# Patient Record
Sex: MALE | Race: WHITE | NOT HISPANIC OR LATINO | Employment: OTHER | URBAN - METROPOLITAN AREA
[De-identification: names, ages, dates, MRNs, and addresses within clinical notes are randomized per-mention and may not be internally consistent; named-entity substitution may affect disease eponyms.]

---

## 2017-03-07 ENCOUNTER — OFFICE VISIT - RIVER FALLS (OUTPATIENT)
Dept: FAMILY MEDICINE | Facility: CLINIC | Age: 62
End: 2017-03-07

## 2017-03-15 ENCOUNTER — OFFICE VISIT - RIVER FALLS (OUTPATIENT)
Dept: FAMILY MEDICINE | Facility: CLINIC | Age: 62
End: 2017-03-15

## 2017-03-15 ASSESSMENT — MIFFLIN-ST. JEOR: SCORE: 1990.55

## 2017-06-30 ENCOUNTER — OFFICE VISIT - RIVER FALLS (OUTPATIENT)
Dept: FAMILY MEDICINE | Facility: CLINIC | Age: 62
End: 2017-06-30

## 2017-06-30 ASSESSMENT — MIFFLIN-ST. JEOR: SCORE: 1961.52

## 2017-09-06 ENCOUNTER — OFFICE VISIT - RIVER FALLS (OUTPATIENT)
Dept: FAMILY MEDICINE | Facility: CLINIC | Age: 62
End: 2017-09-06

## 2017-09-06 ASSESSMENT — MIFFLIN-ST. JEOR: SCORE: 1991.46

## 2018-04-09 ENCOUNTER — AMBULATORY - RIVER FALLS (OUTPATIENT)
Dept: FAMILY MEDICINE | Facility: CLINIC | Age: 63
End: 2018-04-09

## 2018-04-10 LAB
CHOLEST SERPL-MCNC: 138 MG/DL
CHOLEST/HDLC SERPL: 2.3 {RATIO}
CREAT SERPL-MCNC: 0.99 MG/DL (ref 0.7–1.25)
GLUCOSE BLD-MCNC: 125 MG/DL (ref 65–99)
HBA1C MFR BLD: 5.4 %
HDLC SERPL-MCNC: 59 MG/DL
LDLC SERPL CALC-MCNC: 58 MG/DL
NONHDLC SERPL-MCNC: 79 MG/DL
TRIGL SERPL-MCNC: 129 MG/DL

## 2018-04-17 ENCOUNTER — OFFICE VISIT - RIVER FALLS (OUTPATIENT)
Dept: FAMILY MEDICINE | Facility: CLINIC | Age: 63
End: 2018-04-17

## 2018-04-17 ASSESSMENT — MIFFLIN-ST. JEOR: SCORE: 1964.24

## 2018-10-31 ENCOUNTER — OFFICE VISIT - RIVER FALLS (OUTPATIENT)
Dept: FAMILY MEDICINE | Facility: CLINIC | Age: 63
End: 2018-10-31

## 2019-02-06 ENCOUNTER — OFFICE VISIT - RIVER FALLS (OUTPATIENT)
Dept: FAMILY MEDICINE | Facility: CLINIC | Age: 64
End: 2019-02-06

## 2019-02-06 ENCOUNTER — COMMUNICATION - RIVER FALLS (OUTPATIENT)
Dept: FAMILY MEDICINE | Facility: CLINIC | Age: 64
End: 2019-02-06

## 2019-02-06 ASSESSMENT — MIFFLIN-ST. JEOR: SCORE: 1963.34

## 2019-02-07 LAB
BUN SERPL-MCNC: 16 MG/DL (ref 7–25)
BUN/CREAT RATIO - HISTORICAL: ABNORMAL (ref 6–22)
CALCIUM SERPL-MCNC: 9.7 MG/DL (ref 8.6–10.3)
CHLORIDE BLD-SCNC: 102 MMOL/L (ref 98–110)
CO2 SERPL-SCNC: 29 MMOL/L (ref 20–32)
CREAT SERPL-MCNC: 0.99 MG/DL (ref 0.7–1.25)
EGFRCR SERPLBLD CKD-EPI 2021: 81 ML/MIN/1.73M2
GLUCOSE BLD-MCNC: 124 MG/DL (ref 65–99)
HGB BLD-MCNC: 15.8 GM/DL (ref 13.2–17.1)
POTASSIUM BLD-SCNC: 4.4 MMOL/L (ref 3.5–5.3)
SODIUM SERPL-SCNC: 137 MMOL/L (ref 135–146)

## 2019-06-05 ENCOUNTER — OFFICE VISIT - RIVER FALLS (OUTPATIENT)
Dept: FAMILY MEDICINE | Facility: CLINIC | Age: 64
End: 2019-06-05

## 2019-06-05 ASSESSMENT — MIFFLIN-ST. JEOR: SCORE: 1984.2

## 2019-06-06 ENCOUNTER — COMMUNICATION - RIVER FALLS (OUTPATIENT)
Dept: FAMILY MEDICINE | Facility: CLINIC | Age: 64
End: 2019-06-06

## 2019-06-06 LAB
CHOLEST SERPL-MCNC: 133 MG/DL
CHOLEST/HDLC SERPL: 2.1 {RATIO}
HBA1C MFR BLD: 5.6 %
HDLC SERPL-MCNC: 62 MG/DL
LDLC SERPL CALC-MCNC: 53 MG/DL
NONHDLC SERPL-MCNC: 71 MG/DL
TRIGL SERPL-MCNC: 99 MG/DL

## 2019-12-18 ENCOUNTER — OFFICE VISIT - RIVER FALLS (OUTPATIENT)
Dept: FAMILY MEDICINE | Facility: CLINIC | Age: 64
End: 2019-12-18

## 2019-12-18 ASSESSMENT — MIFFLIN-ST. JEOR: SCORE: 1982.39

## 2020-01-20 LAB — COLOGUARD-ABSTRACT: NEGATIVE

## 2020-03-20 ENCOUNTER — OFFICE VISIT - RIVER FALLS (OUTPATIENT)
Dept: FAMILY MEDICINE | Facility: CLINIC | Age: 65
End: 2020-03-20

## 2020-03-31 ENCOUNTER — COMMUNICATION - RIVER FALLS (OUTPATIENT)
Dept: FAMILY MEDICINE | Facility: CLINIC | Age: 65
End: 2020-03-31

## 2020-07-07 ENCOUNTER — AMBULATORY - RIVER FALLS (OUTPATIENT)
Dept: FAMILY MEDICINE | Facility: CLINIC | Age: 65
End: 2020-07-07

## 2020-07-08 ENCOUNTER — COMMUNICATION - RIVER FALLS (OUTPATIENT)
Dept: FAMILY MEDICINE | Facility: CLINIC | Age: 65
End: 2020-07-08

## 2020-07-08 LAB
BUN SERPL-MCNC: 18 MG/DL (ref 7–25)
BUN/CREAT RATIO - HISTORICAL: ABNORMAL (ref 6–22)
CALCIUM SERPL-MCNC: 9.1 MG/DL (ref 8.6–10.3)
CHLORIDE BLD-SCNC: 105 MMOL/L (ref 98–110)
CHOLEST SERPL-MCNC: 131 MG/DL
CHOLEST/HDLC SERPL: 2.1 {RATIO}
CO2 SERPL-SCNC: 28 MMOL/L (ref 20–32)
CREAT SERPL-MCNC: 1.01 MG/DL (ref 0.7–1.25)
EGFRCR SERPLBLD CKD-EPI 2021: 78 ML/MIN/1.73M2
GLUCOSE BLD-MCNC: 112 MG/DL (ref 65–99)
HBA1C MFR BLD: 5.5 %
HDLC SERPL-MCNC: 62 MG/DL
LDLC SERPL CALC-MCNC: 49 MG/DL
NONHDLC SERPL-MCNC: 69 MG/DL
POTASSIUM BLD-SCNC: 3.8 MMOL/L (ref 3.5–5.3)
SODIUM SERPL-SCNC: 140 MMOL/L (ref 135–146)
TRIGL SERPL-MCNC: 110 MG/DL

## 2020-07-21 ENCOUNTER — OFFICE VISIT - RIVER FALLS (OUTPATIENT)
Dept: FAMILY MEDICINE | Facility: CLINIC | Age: 65
End: 2020-07-21

## 2020-07-21 ASSESSMENT — MIFFLIN-ST. JEOR: SCORE: 2012.33

## 2021-02-18 ENCOUNTER — OFFICE VISIT - RIVER FALLS (OUTPATIENT)
Dept: FAMILY MEDICINE | Facility: CLINIC | Age: 66
End: 2021-02-18

## 2021-02-18 ASSESSMENT — MIFFLIN-ST. JEOR: SCORE: 2018.22

## 2021-03-10 ENCOUNTER — AMBULATORY - RIVER FALLS (OUTPATIENT)
Dept: FAMILY MEDICINE | Facility: CLINIC | Age: 66
End: 2021-03-10

## 2021-04-07 ENCOUNTER — AMBULATORY - RIVER FALLS (OUTPATIENT)
Dept: FAMILY MEDICINE | Facility: CLINIC | Age: 66
End: 2021-04-07

## 2021-06-06 ENCOUNTER — OFFICE VISIT - RIVER FALLS (OUTPATIENT)
Dept: FAMILY MEDICINE | Facility: CLINIC | Age: 66
End: 2021-06-06

## 2021-06-06 ASSESSMENT — MIFFLIN-ST. JEOR: SCORE: 2024.12

## 2021-06-09 ENCOUNTER — OFFICE VISIT - RIVER FALLS (OUTPATIENT)
Dept: FAMILY MEDICINE | Facility: CLINIC | Age: 66
End: 2021-06-09

## 2021-06-09 ASSESSMENT — MIFFLIN-ST. JEOR: SCORE: 2032.74

## 2021-08-20 ENCOUNTER — OFFICE VISIT - RIVER FALLS (OUTPATIENT)
Dept: FAMILY MEDICINE | Facility: CLINIC | Age: 66
End: 2021-08-20

## 2021-08-20 ASSESSMENT — MIFFLIN-ST. JEOR: SCORE: 2033.64

## 2021-08-21 LAB
BUN SERPL-MCNC: 14 MG/DL (ref 7–25)
BUN/CREAT RATIO - HISTORICAL: ABNORMAL (ref 6–22)
CALCIUM SERPL-MCNC: 9.4 MG/DL (ref 8.6–10.3)
CHLORIDE BLD-SCNC: 103 MMOL/L (ref 98–110)
CHOLEST SERPL-MCNC: 119 MG/DL
CHOLEST/HDLC SERPL: 2.4 {RATIO}
CO2 SERPL-SCNC: 28 MMOL/L (ref 20–32)
CREAT SERPL-MCNC: 0.95 MG/DL (ref 0.7–1.25)
EGFRCR SERPLBLD CKD-EPI 2021: 83 ML/MIN/1.73M2
GLUCOSE BLD-MCNC: 115 MG/DL (ref 65–99)
HDLC SERPL-MCNC: 50 MG/DL
LDLC SERPL CALC-MCNC: 51 MG/DL
NONHDLC SERPL-MCNC: 69 MG/DL
POTASSIUM BLD-SCNC: 3.9 MMOL/L (ref 3.5–5.3)
SODIUM SERPL-SCNC: 138 MMOL/L (ref 135–146)
TRIGL SERPL-MCNC: 94 MG/DL

## 2021-08-24 ENCOUNTER — COMMUNICATION - RIVER FALLS (OUTPATIENT)
Dept: FAMILY MEDICINE | Facility: CLINIC | Age: 66
End: 2021-08-24

## 2022-02-02 ENCOUNTER — AMBULATORY - RIVER FALLS (OUTPATIENT)
Dept: FAMILY MEDICINE | Facility: CLINIC | Age: 67
End: 2022-02-02

## 2022-02-12 VITALS
DIASTOLIC BLOOD PRESSURE: 79 MMHG | BODY MASS INDEX: 33.82 KG/M2 | WEIGHT: 255.2 LBS | SYSTOLIC BLOOD PRESSURE: 113 MMHG | HEART RATE: 76 BPM | HEIGHT: 73 IN | TEMPERATURE: 98.3 F

## 2022-02-12 VITALS
DIASTOLIC BLOOD PRESSURE: 70 MMHG | SYSTOLIC BLOOD PRESSURE: 124 MMHG | HEIGHT: 73 IN | BODY MASS INDEX: 33.03 KG/M2 | WEIGHT: 249.2 LBS | TEMPERATURE: 98.3 F | HEART RATE: 74 BPM | OXYGEN SATURATION: 94 %

## 2022-02-12 VITALS
HEART RATE: 67 BPM | DIASTOLIC BLOOD PRESSURE: 79 MMHG | WEIGHT: 248.6 LBS | HEIGHT: 73 IN | SYSTOLIC BLOOD PRESSURE: 132 MMHG | TEMPERATURE: 98 F | BODY MASS INDEX: 32.95 KG/M2

## 2022-02-12 VITALS
WEIGHT: 259.8 LBS | BODY MASS INDEX: 34.43 KG/M2 | HEIGHT: 73 IN | DIASTOLIC BLOOD PRESSURE: 78 MMHG | SYSTOLIC BLOOD PRESSURE: 135 MMHG | TEMPERATURE: 98.1 F | HEART RATE: 76 BPM

## 2022-02-12 VITALS
HEART RATE: 71 BPM | TEMPERATURE: 97.7 F | BODY MASS INDEX: 33.41 KG/M2 | SYSTOLIC BLOOD PRESSURE: 138 MMHG | WEIGHT: 253.2 LBS | DIASTOLIC BLOOD PRESSURE: 78 MMHG

## 2022-02-12 VITALS
BODY MASS INDEX: 33.75 KG/M2 | OXYGEN SATURATION: 94 % | DIASTOLIC BLOOD PRESSURE: 78 MMHG | BODY MASS INDEX: 33.8 KG/M2 | WEIGHT: 255.8 LBS | HEIGHT: 73 IN | HEART RATE: 76 BPM | WEIGHT: 255 LBS | SYSTOLIC BLOOD PRESSURE: 128 MMHG | TEMPERATURE: 98.6 F | HEART RATE: 76 BPM | TEMPERATURE: 99 F | SYSTOLIC BLOOD PRESSURE: 132 MMHG | DIASTOLIC BLOOD PRESSURE: 78 MMHG

## 2022-02-12 VITALS
HEIGHT: 73 IN | BODY MASS INDEX: 33 KG/M2 | HEART RATE: 66 BPM | SYSTOLIC BLOOD PRESSURE: 127 MMHG | DIASTOLIC BLOOD PRESSURE: 77 MMHG | WEIGHT: 249 LBS | TEMPERATURE: 97.2 F

## 2022-02-12 VITALS
SYSTOLIC BLOOD PRESSURE: 152 MMHG | BODY MASS INDEX: 33.56 KG/M2 | TEMPERATURE: 97.2 F | HEART RATE: 70 BPM | HEIGHT: 73 IN | WEIGHT: 253.2 LBS | DIASTOLIC BLOOD PRESSURE: 84 MMHG

## 2022-02-12 VITALS
HEART RATE: 67 BPM | DIASTOLIC BLOOD PRESSURE: 75 MMHG | WEIGHT: 264.3 LBS | SYSTOLIC BLOOD PRESSURE: 134 MMHG | TEMPERATURE: 98.2 F | DIASTOLIC BLOOD PRESSURE: 77 MMHG | HEART RATE: 64 BPM | TEMPERATURE: 97 F | BODY MASS INDEX: 34.78 KG/M2 | BODY MASS INDEX: 35.03 KG/M2 | HEIGHT: 73 IN | HEIGHT: 73 IN | OXYGEN SATURATION: 95 % | SYSTOLIC BLOOD PRESSURE: 154 MMHG | WEIGHT: 262.4 LBS

## 2022-02-12 VITALS
HEART RATE: 75 BPM | BODY MASS INDEX: 33.61 KG/M2 | HEIGHT: 73 IN | WEIGHT: 253.6 LBS | SYSTOLIC BLOOD PRESSURE: 132 MMHG | TEMPERATURE: 98.3 F | DIASTOLIC BLOOD PRESSURE: 72 MMHG

## 2022-02-12 VITALS
BODY MASS INDEX: 34.6 KG/M2 | HEIGHT: 73 IN | SYSTOLIC BLOOD PRESSURE: 128 MMHG | WEIGHT: 261.1 LBS | DIASTOLIC BLOOD PRESSURE: 76 MMHG | TEMPERATURE: 97.3 F | HEART RATE: 62 BPM

## 2022-02-12 VITALS
BODY MASS INDEX: 35.06 KG/M2 | TEMPERATURE: 98.7 F | SYSTOLIC BLOOD PRESSURE: 138 MMHG | DIASTOLIC BLOOD PRESSURE: 80 MMHG | HEIGHT: 73 IN | HEART RATE: 61 BPM | WEIGHT: 264.5 LBS

## 2022-02-15 NOTE — LETTER
(Inserted Image. Unable to display)   January 17, 2020      ALEC VEGA   [place], [place] [id number]        Dear ALEC,      Thank you for selecting [facility] for your healthcare needs.     The results of the cologuard stool test was received and your result was negative.      A negative result indicates a lower likelihood that colorectal cancer or advanced adenoma (pre-cancer) is present.  Periodic Colorectal Cancer Screenings are recommended in the future approximately every 3 years.            Please contact me or my assistant at [phone] if you have any questions or concerns.     Sincerely,        Samuel Zacarias MD

## 2022-02-15 NOTE — NURSING NOTE
Comprehensive Intake Entered On:  6/9/2021 8:47 AM CDT    Performed On:  6/9/2021 8:43 AM CDT by Jessi King CMA               Summary   Chief Complaint :   f/u right elbow olecranon busitis - seen by RONEN Schwartz am, wrapping up until yest - Lana noticed bruising down arm, denies much pain, swelling has decreased    Weight Measured :   264.3 lb(Converted to: 264 lb 5 oz, 119.884 kg)    Height Measured :   73 in(Converted to: 6 ft 1 in, 185.42 cm)    Body Mass Index :   34.87 kg/m2 (HI)    Body Surface Area :   2.48 m2   Systolic Blood Pressure :   146 mmHg (HI)    Diastolic Blood Pressure :   75 mmHg   Mean Arterial Pressure :   99 mmHg   Peripheral Pulse Rate :   67 bpm   Temperature Tympanic :   98.2 DegF(Converted to: 36.8 DegC)    Oxygen Saturation :   95 %   Jessi King CMA - 6/9/2021 8:43 AM CDT   Health Status   Allergies Verified? :   Yes   Medication History Verified? :   Yes   Medical History Verified? :   Yes   Pre-Visit Planning Status :   Completed   Tobacco Use? :   Former smoker   Jessi King CMA - 6/9/2021 8:43 AM CDT   ID Risk Screen   Recent Travel History :   No recent travel   Family Member Travel History :   No recent travel   Other Exposure to Infectious Disease :   Unknown   COVID-19 Testing Status :   No positive COVID-19 test   Jessi King CMA - 6/9/2021 8:43 AM CDT   Social History   Social History   (As Of: 6/9/2021 8:47:39 AM CDT)   Alcohol:        Current, socially   (Last Updated: 4/20/2018 9:05:15 AM CDT by Mackenzie Lomax)          Tobacco:        Former smoker, quit more than 30 days ago   (Last Updated: 2/18/2021 9:58:55 AM CST by Julia Palencia)          Electronic Cigarette/Vaping:        Electronic Cigarette Use: Never.   (Last Updated: 2/18/2021 9:58:59 AM CST by Julia Palencia)          Substance Abuse:        Never   (Last Updated: 4/20/2018 9:05:30 AM CDT by Mackenzie Lomax)          Employment/School:        Retired   (Last Updated: 4/20/2018 9:05:39 AM CDT by Dami  Mackenzie)          Home/Environment:        Marital status: .  Spouse/Partner name: Mary.  Risks in environment: Does not wear helmet.   (Last Updated: 4/20/2018 9:05:51 AM CDT by Mackenzie Lomax)          Nutrition/Health:        Type of diet: Regular.   (Last Updated: 4/20/2018 9:06:01 AM CDT by Mackenzie Lomax)          Exercise:        Exercise type: Walking.   (Last Updated: 4/20/2018 9:06:05 AM CDT by Mackenzie Lomax)          Sexual:        Sexually active: Yes.  Sexual orientation: Straight or heterosexual.  Contraceptive Use Details: None.   (Last Updated: 4/20/2018 9:06:15 AM CDT by Mackenzie Lomax)

## 2022-02-15 NOTE — PROGRESS NOTES
Patient:   ALEC VEGA            MRN: [id number]            FIN: [id number]               Age:   63 years     Sex:  Male     :  1955   Associated Diagnoses:   None   Author:   Rell KHAN, Samuel      Procedure   EKG procedure   Indication: preop.     Position: supine.     EKG findings   Interpretation: by primary care provider.     Rhythm: sinus bradycardia.     Axis: normal axis, normal configuration.     Within normal limits.     Intervals: NE normal, QRS normal, QT normal.     Normal EKG.     P waves: normal.     QRS complex: normal, no Q waves present.     ST-T-U complex: normal.     Interpretation: normal EKG.     Discussed: with patient.        Impression and Plan   Orders

## 2022-02-15 NOTE — NURSING NOTE
Vital Signs Entered On:  6/9/2021 8:48 AM CDT    Performed On:  6/9/2021 8:47 AM CDT by Jessi King CMA               Vital Signs   Systolic Blood Pressure :   134 mmHg (HI)    Diastolic Blood Pressure :   75 mmHg   Mean Arterial Pressure :   95 mmHg   Jessi King CMA - 6/9/2021 8:47 AM CDT

## 2022-02-15 NOTE — LETTER
(Inserted Image. Unable to display)   May 01, 2019        ALEC VEGA   [place], [place] [id number]        Dear ALEC,      Thank you for selecting [facility] (previously [facility] for your healthcare needs.    Our records indicate you are due for the following services:     Hypertension check ~ please remember to bring your at-home blood pressure readings with you to your appointment.     To schedule an appointment or if you have further questions, please contact your primary clinic:   [facility]       [phone]   Replaced by [facility]       [phone]              [facility]     [phone]      Powered by [employer]    Sincerely,    Samuel Zacarias M.D.

## 2022-02-15 NOTE — TELEPHONE ENCOUNTER
Entered by Mya Núñez CMA on November 30, 2019 1:14:49 PM CST  ---------------------  From: Mya Nñúez CMA   To: [facility]    Sent: 11/30/2019 1:14:49 PM CST  Subject: Medication Management     ** Submitted: **  Order:losartan (losartan 100 mg oral tablet)  1 tab(s)  Oral  daily  Qty:  30 tab(s)        Refills:  0          Substitutions Allowed     Route To [facility]    Signed by Mya Núñez CMA  11/30/2019 1:14:00 PM    ** Submitted: **  Complete:losartan (losartan 100 mg oral tablet)   Signed by Mya Núñez CMA  11/30/2019 1:14:00 PM    ** Not Approved:  **  losartan (LOSARTAN POTASSIUM 100MG TABS)  TAKE ONE TABLET BY MOUTH EVERY DAY  Qty:  30 unknown unit        Days Supply:  30        Refills:  5          Substitutions Allowed     Route To [facility]   Signed by Mya Núñez CMA            ------------------------------------------  From: [facility]  To: Samuel Zacarias MD  Sent: November 29, 2019 8:27:43 AM CST  Subject: Medication Management  Due: November 30, 2019 8:27:43 AM CST    ** On Hold Pending Signature **  Drug: losartan (losartan 100 mg oral tablet)  TAKE ONE TABLET BY MOUTH EVERY DAY  Quantity: 30 unknown unit  Days Supply: 30  Refills: 5  Substitutions Allowed  Notes from Pharmacy:     Dispensed Drug: losartan (losartan 100 mg oral tablet)  TAKE ONE TABLET BY MOUTH EVERY DAY  Quantity: 30 unknown unit  Days Supply: 30  Refills: 5  Substitutions Allowed  Notes from Pharmacy:   ------------------------------------------Med Refill      Date of last office visit and reason:  6/5/19; HTN      Date of last Med Check / Px:   6/5/19  Date of last labs pertaining to med:  2/6/19    RTC order in chart:  yes; December    For Protocol refill, has patient been contacted:  msg sent to pharmacy

## 2022-02-15 NOTE — NURSING NOTE
Hearing and Vision Screening Entered On:  6/5/2019 1:02 PM CDT    Performed On:  6/5/2019 1:01 PM CDT by Julia Palencia               Hearing and Vision Screening   Audiogram Result Right Ear :   Pass   Audiogram Result Left Ear :   Pass   Hearing Screen Comments :   no concerns with hearing.    Julia Palencia - 6/5/2019 1:01 PM CDT

## 2022-02-15 NOTE — NURSING NOTE
CAGE Assessment Entered On:  7/21/2020 1:02 PM CDT    Performed On:  7/21/2020 1:02 PM CDT by Julia Palencia               Assessment   Have you ever felt you should cut down on your drinking :   No   Have people annoyed you by criticizing your drinking :   No   Have you ever felt bad or guilty about your drinking :   No   Have you ever taken a drink first thing in the morning to steady your nerves or get rid of a hangover (Eye-opener) :   No   CAGE Score :   0    Julia Palencia - 7/21/2020 1:02 PM CDT

## 2022-02-15 NOTE — NURSING NOTE
Comprehensive Intake Entered On:  6/6/2021 9:37 AM CDT    Performed On:  6/6/2021 9:35 AM CDT by Julia Palencia               Summary   Chief Complaint :   Right elbow swelling, minimal pain.  Did have insect bite 3 weeks ago and again last week.    Weight Measured :   262.4 lb(Converted to: 262 lb 6 oz, 119.023 kg)    Height Measured :   73 in(Converted to: 6 ft 1 in, 185.42 cm)    Body Mass Index :   34.62 kg/m2 (HI)    Body Surface Area :   2.47 m2   Systolic Blood Pressure :   154 mmHg (HI)    Diastolic Blood Pressure :   77 mmHg   Mean Arterial Pressure :   103 mmHg   Peripheral Pulse Rate :   64 bpm   BP Method :   Electronic   HR Method :   Electronic   Temperature Tympanic :   97.0 DegF(Converted to: 36.1 DegC)  (LOW)    Julia Palencia - 6/6/2021 9:35 AM CDT   Health Status   Allergies Verified? :   Yes   Medication History Verified? :   Yes   Julia Palencia - 6/6/2021 9:35 AM CDT   ID Risk Screen   Recent Travel History :   No recent travel   Family Member Travel History :   No recent travel   Other Exposure to Infectious Disease :   Unknown   COVID-19 Testing Status :   No positive COVID-19 test   Julia Palencia - 6/6/2021 9:35 AM CDT   Social History   Social History   (As Of: 6/6/2021 9:37:33 AM CDT)   Alcohol:        Current, socially   (Last Updated: 4/20/2018 9:05:15 AM CDT by Mackenzie Lomax)          Tobacco:        Former smoker, quit more than 30 days ago   (Last Updated: 2/18/2021 9:58:55 AM CST by Julia Palencia)          Electronic Cigarette/Vaping:        Electronic Cigarette Use: Never.   (Last Updated: 2/18/2021 9:58:59 AM CST by Julia Palencia)          Substance Abuse:        Never   (Last Updated: 4/20/2018 9:05:30 AM CDT by Mackenzie Lomax)          Employment/School:        Retired   (Last Updated: 4/20/2018 9:05:39 AM CDT by Mackenzie Lomax)          Home/Environment:        Marital status: .  Spouse/Partner name: Mary.  Risks in environment: Does not wear helmet.    (Last Updated: 4/20/2018 9:05:51 AM CDT by Mackenzie Lomax)          Nutrition/Health:        Type of diet: Regular.   (Last Updated: 4/20/2018 9:06:01 AM CDT by Mackenzie Lomax)          Exercise:        Exercise type: Walking.   (Last Updated: 4/20/2018 9:06:05 AM CDT by Mackenzie Lomax)          Sexual:        Sexually active: Yes.  Sexual orientation: Straight or heterosexual.  Contraceptive Use Details: None.   (Last Updated: 4/20/2018 9:06:15 AM CDT by Mackenzie Lomax)

## 2022-02-15 NOTE — TELEPHONE ENCOUNTER
---------------------From: Lucy Cali CMA (Phone Messages Pool ([id number]_[facility])) To: [employer] Message Pool ([id number]_[facility]);   Sent: 12/5/2019 1:25:50 PM CSTSubject: Labs Patient will be scheduling a six month medication review visit and is wondering if any labs are needed.   His blood glucose was elevated at last visit at 126 but the A1c was 5.6.  Lipids and BMP completed at last visit.Any labs needed?[phone]  ok to leave a message---------------------From: Gala Gagnon ([employer] Message Pool ([id number])) To: Samuel Zacarias MD;   Sent: 12/6/2019 12:03:59 PM CSTSubject: FW: Labs---------------------From: Samuel Zacarias MD To: [employer] Message Pool ([id number]_[facility]);   Sent: 12/6/2019 12:25:08 PM CSTSubject: RE: Labs no labNotified pt by phone call- he will call back to schedule med check.

## 2022-02-15 NOTE — LETTER
(Inserted Image. Unable to display)   July 08, 2020      ALECTIRSO VEGA       [place], [place] [id number]        Dear ALEC,    Thank you for selecting [facility] for your healthcare needs.  Below you will find the results of the recent tests done at our clinic.     All labs acceptable.      Result Name Current Result Previous Result Reference Range   Potassium Level (mmol/L)  3.8 7/7/2020  4.4 2/6/2019 3.5 - 5.3   Glucose Level (mg/dL) ((H)) 112 7/7/2020 ((H)) 124 2/6/2019 65 - 99   Creatinine Level (mg/dL)  1.01 7/7/2020  0.99 2/6/2019 0.70 - 1.25   Hgb A1c  5.5 7/7/2020  5.6 6/5/2019  - <5.7   Cholesterol (mg/dL)  131 7/7/2020  133 6/5/2019  - <200   HDL (mg/dL)  62 7/7/2020  62 6/5/2019 > OR = 40 -    LDL  49 7/7/2020  53 6/5/2019    Triglyceride (mg/dL)  110 7/7/2020  99 6/5/2019  - <150       Please contact me or my assistant at [phone] if you have any questions.     Sincerely,        Samuel Zacarias MD        What do your labs mean?  Below is a glossary of commonly ordered labs:  LDL   Bad Cholesterol   HDL   Good Cholesterol  AST/ALT   Liver Function   Cr/Creatinine   Kidney Function  Microalbumin   Kidney Function  BUN   Kidney Function  PSA   Prostate    TSH   Thyroid Hormone  HgbA1c   Diabetes Test   Hgb (Hemoglobin)   Red Blood Cells

## 2022-02-15 NOTE — NURSING NOTE
Comprehensive Intake Entered On:  8/20/2021 2:04 PM CDT    Performed On:  8/20/2021 2:02 PM CDT by Julia Palencia               Summary   Chief Complaint :   Chronic disease visit: HTN   Weight Measured :   264.5 lb(Converted to: 264 lb 8 oz, 119.975 kg)    Height Measured :   73 in(Converted to: 6 ft 1 in, 185.42 cm)    Body Mass Index :   34.89 kg/m2 (HI)    Body Surface Area :   2.48 m2   Systolic Blood Pressure :   138 mmHg (HI)    Diastolic Blood Pressure :   80 mmHg   Mean Arterial Pressure :   99 mmHg   Peripheral Pulse Rate :   61 bpm   BP Method :   Electronic   HR Method :   Electronic   Temperature Tympanic :   98.7 DegF(Converted to: 37.1 DegC)    Julia Palencia - 8/20/2021 2:02 PM CDT   Health Status   Allergies Verified? :   Yes   Medication History Verified? :   Yes   Pre-Visit Planning Status :   Completed   Tobacco Use? :   Former smoker   Julia Palencia - 8/20/2021 2:02 PM CDT   Social History   Social History   (As Of: 8/20/2021 2:04:20 PM CDT)   Alcohol:        Current, socially   (Last Updated: 4/20/2018 9:05:15 AM CDT by Mackenzie Lomax)          Tobacco:        Former smoker, quit more than 30 days ago   (Last Updated: 2/18/2021 9:58:55 AM CST by Julia Palencia)          Electronic Cigarette/Vaping:        Electronic Cigarette Use: Never.   (Last Updated: 2/18/2021 9:58:59 AM CST by Julia Palencia)          Substance Abuse:        Never   (Last Updated: 4/20/2018 9:05:30 AM CDT by Mackenzie Lomax)          Employment/School:        Retired   (Last Updated: 4/20/2018 9:05:39 AM CDT by Mackenzie Lomax)          Home/Environment:        Marital status: .  Spouse/Partner name: Mary.  Risks in environment: Does not wear helmet.   (Last Updated: 4/20/2018 9:05:51 AM CDT by Mackenzie Lomax)          Nutrition/Health:        Type of diet: Regular.   (Last Updated: 4/20/2018 9:06:01 AM CDT by Mackenzie Lomax)          Exercise:        Exercise type: Walking.   (Last Updated: 4/20/2018 9:06:05  AM CDT by Mackenzie Lomax)          Sexual:        Sexually active: Yes.  Sexual orientation: Straight or heterosexual.  Contraceptive Use Details: None.   (Last Updated: 4/20/2018 9:06:15 AM CDT by Mackenzie Lomax)

## 2022-02-15 NOTE — PROGRESS NOTES
Patient:   ALEC VEGA            MRN: [id number]            FIN: [id number]               Age:   62 years     Sex:  Male     :  1955   Associated Diagnoses:   Hypertension; Dyslipidemia; Blood Glucose Elevated   Author:   Samuel Zacarias MD      Visit Information      Date of Service: 2017 03:45 pm  Performing Location: [facility]  Encounter#: [id number]      Primary Care Provider (PCP):  Samuel Zacarias MD    NPI# [id number]      Referring Provider:  Samuel Zacarias MD# [id number]      Chief Complaint   2017 3:49 PM CDT     HTN check up.        History of Present Illness             The patient presents for follow-up evaluation of hypertension.  The quality of hypertension symptom(s) since the patient's last visit is described as being unchanged.  Exacerbating factors consist of noncompliance with diet.  Relieving factors consist of medication.  Associated symptoms consist of none.     chief complaint and symptoms as noted above confirmed with patient   meds as directed rare use of flexeril for back      Review of Systems   Constitutional:  Negative.    Eye:  Negative.    Ear/Nose/Mouth/Throat:  Negative.    Respiratory:  Negative.    Cardiovascular:  Negative.    Gastrointestinal:  Negative.    Genitourinary:  Negative.    Hematology/Lymphatics:  Negative.    Endocrine:  Negative.    Immunologic:  Negative.    Musculoskeletal:  Negative except as documented in history of present illness.    Integumentary:  Negative.    Neurologic:  Negative.       Health Status   Allergies:    Canceled/Inactive Reactions (Selected)  No known allergies   Medications:  (Selected)   Prescriptions  Prescribed  Cozaar 25 mg oral tablet: 1 tab(s) ( 25 mg ), PO, Daily, # 90 tab(s), 1 Refill(s), Type: Maintenance, Pharmacy: [facility], 1 tab(s) po daily  Flexeril 10 mg oral tablet: 1 tab(s) ( 10 mg ), PO, TID, PRN: for spasm and pain, # 30 tab(s), 0 Refill(s),  COVID-19 Week 1 Survey      Responses   [facility] patient discharged from? [place]   Does the patient have one of the following disease processes/diagnoses(primary or secondary)? COVID-19   COVID-19 underlying condition? None   Call Number Call [phone]   Week 1 Call successful? No   Discharge diagnosis Pneumonia of right lower lobe due to methicillin resistant Staphylococcus aureus /COVID-19           Dottie Cruz LPN   Type: Maintenance, Pharmacy: [facility], 1 tab(s) po tid,PRN:for spasm and pain  Lipitor 20 mg oral tablet: 1 tab(s) ( 20 mg ), PO, Daily, # 90 tab(s), 1 Refill(s), Type: Maintenance, Pharmacy: [facility], 1 tab(s) po daily  amLODIPine 10 mg oral tablet: 1 tab(s) ( 10 mg ), po, daily, # 90 tab(s), 1 Refill(s), Type: Maintenance, Pharmacy: [facility], 1 tab(s) po daily  hydroCHLOROthiazide 12.5 mg oral capsule: 1 cap(s) ( 12.5 mg ), po, daily, # 90 cap(s), 1 Refill(s), Type: Maintenance, Pharmacy: [facility], 1 cap(s) po daily  Documented Medications  Documented  aspirin 81 mg oral tablet: 1 tab(s) ( 81 mg ), po, daily, 0 Refill(s), Type: Maintenance   Problem list:    All Problems  Biceps Tendonitis / ICD-9-.12 / Confirmed  Chronic Tension Headaches / ICD-9-.81 / Confirmed  Dyslipidemia / ICD-9-.4 / Confirmed  FH: stroke / SNOMED CT 050320675 / Confirmed  Hypertension / SNOMED CT 8329237659 / Confirmed  Hypertriglyceridemia / ICD-9-.1 / Confirmed  Obese / ICD-9-.00 / Probable  Resolved: CT Cardiac Calcium Score      Histories   Past Medical History:    Active  Biceps Tendonitis (726.12): Onset on 2/27/2010 at 54 years.  Comments:  4/20/2011 CDT 10:35 AM CDT - Geovanan Hui.  Hypertriglyceridemia (272.1): Onset on 6/4/2008 at 53 years.  FH: stroke (849017254)  Dyslipidemia (272.4)  Chronic Tension Headaches (307.81)  Resolved  CT Cardiac Calcium Score:  Resolved.  Comments:  8/20/2014 CDT 1:07 PM CDT - Julia Palencia  Date of test 8/7/14  Score: 141   Family History:    No family history items have been selected or recorded.   Procedure history:    Prosthetic hemiarthroplasty of joint (SNOMED CT 9735089383) on 4/15/2014 at 58 Years.  Comments:  10/8/2015 6:35 PM - Jayme LIMA, Chema SOTO.  left medial knee  Flexible sigmoidoscopy (SNOMED CT 09829598) on 5/20/2011 at 56  Years.  Flexible sigmoidoscopy (SNOMED CT 15283037) on 2/9/2006 at 50 Years.  Arthroscopy of shoulder (SNOMED CT 674989094) on 12/29/1998 at 43 Years.  Comments:  4/20/2011 10:52 AM - Geovanna Hui  Right.  Bunionectomy (SNOMED CT 29317659).  Breast surgery (SNOMED CT 5318774162).  Comments:  4/20/2011 10:47 AM - Geovanna Hui  As a child.   Social History:        Alcohol Assessment: Current                     Comments:                      04/20/2011 - Geovanna Hui                     Social.      Tobacco Assessment: Denies Tobacco Use      Other Assessment            Marital status.                     Comments:                      04/20/2011 - Geovanna Hui                     .        Physical Examination   Vital Signs   9/6/2017 3:49 PM CDT Temperature Tympanic 98.3 DegF    Peripheral Pulse Rate 76 bpm    HR Method Electronic    Systolic Blood Pressure 113 mmHg    Diastolic Blood Pressure 79 mmHg    Mean Arterial Pressure 90 mmHg    BP Method Electronic      Measurements from flowsheet : Measurements   9/6/2017 3:49 PM CDT Height Measured - Standard 73 in    Weight Measured - Standard 255.2 lb    BSA 2.44 m2    Body Mass Index 33.67 kg/m2      General:  Alert and oriented, No acute distress.    Eye:  Normal conjunctiva.    HENT:  Normocephalic, Tympanic membranes are clear, Oral mucosa is moist, No pharyngeal erythema.    Neck:  Supple, Non-tender, No lymphadenopathy, No thyromegaly.    Respiratory:  Breath sounds are equal, Symmetrical chest wall expansion.         Respirations: Are within normal limits.         Pattern: Regular.         Breath sounds: Bilateral, Within normal limits.    Cardiovascular:  Normal rate, Regular rhythm, No murmur, Normal peripheral perfusion, No edema.    Gastrointestinal:  Soft, Non-tender, Non-distended, Normal bowel sounds, No organomegaly.    Genitourinary:  No costovertebral angle tenderness.    Musculoskeletal:  mild tenderness anterior r ankle.     Integumentary:  Warm, Dry, No rash.       Health Maintenance      Recommendations     Pending (in the next year)        OverDue           Colorectal Cancer Screen (Occult Blood) (Male) due  05/03/13  and every 1  year(s)           Depression Screen (Male) due  06/19/14  and every 1  year(s)           Colorectal Cancer Screen (Sigmoidoscopy) (Male) due  05/20/16  and every 5  year(s)        Due            Influenza Vaccine due  09/06/17  and every 1  year(s)           Lung Cancer Screen (Male) due  09/06/17  and every 1  year(s)        Due In Future            Lipid Disorders Screen (Male) not due until  03/07/18  and every 1  year(s)           Type 2 Diabetes Mellitus Screen (Male) not due until  03/07/18  and every 1  year(s)           Body Mass Index Check (Male) not due until  06/30/18  and every 1  year(s)           High Blood Pressure Screen (Male) not due until  06/30/18  and every 1  year(s)           Tobacco Use Screen (Male) not due until  06/30/18  and every 1  year(s)     Satisfied (in the past 1 year)        Satisfied            Body Mass Index Check (Male) on  06/30/17.           Body Mass Index Check (Male) on  03/15/17.           Body Mass Index Check (Male) on  09/07/16.           High Blood Pressure Screen (Male) on  06/30/17.           High Blood Pressure Screen (Male) on  03/15/17.           High Blood Pressure Screen (Male) on  03/07/17.           High Blood Pressure Screen (Male) on  11/20/16.           High Blood Pressure Screen (Male) on  10/17/16.           High Blood Pressure Screen (Male) on  10/17/16.           High Blood Pressure Screen (Male) on  09/07/16.           Lipid Disorders Screen (Male) on  03/07/17.           Lipid Disorders Screen (Male) on  03/07/17.           Lipid Disorders Screen (Male) on  03/07/17.           Lipid Disorders Screen (Male) on  03/07/17.           Obesity Screen and Counseling (Male) on  06/30/17.           Obesity Screen and Counseling (Male) on   03/15/17.           Obesity Screen and Counseling (Male) on  03/07/17.           Obesity Screen and Counseling (Male) on  09/07/16.           Tobacco Use Screen (Male) on  06/30/17.           Tobacco Use Screen (Male) on  03/15/17.           Tobacco Use Screen (Male) on  03/07/17.           Tobacco Use Screen (Male) on  11/20/16.           Tobacco Use Screen (Male) on  09/07/16.           Type 2 Diabetes Mellitus Screen (Male) on  03/07/17.           Type 2 Diabetes Mellitus Screen (Male) on  10/17/16.        Review / Management   Results review      Impression and Plan   Diagnosis     Hypertension (ZGJ93-ZI I10).     Dyslipidemia (BWV05-JN E78.5).     Blood Glucose Elevated (ZAM14-PU R73.09).     Course:  Progressing as expected.    Plan:  Monitor blood pressure, Weight monitoring, fu 6mo, labs then BMI,Weight loss,exercise,diet discussed   .    Patient Instructions:       Counseled: Patient, Regarding diagnosis, Regarding treatment, Regarding medications, Diet.

## 2022-02-15 NOTE — PROGRESS NOTES
Patient:   ALEC VEGA            MRN: [id number]            FIN: [id number]               Age:   64 years     Sex:  Male     :  1955   Associated Diagnoses:   Hypertension; Dyslipidemia; Blood Glucose Elevated   Author:   Samuel Zacarias MD      Visit Information      Date of Service: 2019 02:55 pm  Performing Location: [facility]  Encounter#: [id number]      Primary Care Provider (PCP):  Samuel Zacarias MD    NPI# [id number]      Referring Provider:  Samuel Zacarias MD# [id number]      Chief Complaint   2019 2:56 PM CST   HTN check up        History of Present Illness             The patient presents for follow-up evaluation of hypertension.  The quality of hypertension symptom(s) since the patient's last visit is described as being unchanged.  Exacerbating factors consist of noncompliance with diet.  Relieving factors consist of medication.  Associated symptoms consist of none.     chief complaint and symptoms as noted above confirmed with patient   meds as directed rare use of flexeril for back      Review of Systems   Constitutional:  Negative.    Eye:  Negative.    Ear/Nose/Mouth/Throat:  Negative.    Respiratory:  Negative.    Cardiovascular:  Negative.    Gastrointestinal:  Negative.    Genitourinary:  Negative.    Hematology/Lymphatics:  Negative.    Endocrine:  Negative.    Immunologic:  Negative.    Musculoskeletal:  Negative except as documented in history of present illness.    Integumentary:  Negative.    Neurologic:  Negative.       Health Status   Allergies:    Canceled/Inactive Reactions (Selected)  No known allergies   Medications:  (Selected)   Prescriptions  Prescribed  amLODIPine 10 mg oral tablet: = 1 tab(s), Oral, daily, # 30 tab(s), 0 Refill(s), Type: Maintenance, Pharmacy: [facility], Pt is due for office visit., 1 tab(s) Oral daily  atorvastatin 20 mg oral tablet: = 1 tab(s) ( 20 mg ), Oral, daily, # 30 tab(s), 0  Refill(s), Type: Maintenance, Pharmacy: [facility], Needs appt for further refills, 1 tab(s) Oral daily  hydroCHLOROthiazide 12.5 mg oral capsule: = 1 cap(s) ( 12.5 mg ), Oral, daily, # 30 cap(s), 0 Refill(s), Type: Soft Stop, Pharmacy: [facility], Pt is due for office visit., 1 cap(s) Oral daily  losartan 100 mg oral tablet: = 1 tab(s), Oral, daily, # 30 tab(s), 0 Refill(s), Type: Maintenance, Pharmacy: [facility], Needs appt for further refills  Documented Medications  Documented  aspirin 81 mg oral tablet: 1 tab(s) ( 81 mg ), po, daily, 0 Refill(s), Type: Maintenance,    Medications          *denotes recorded medication          amLODIPine 10 mg oral tablet: 1 tab(s), Oral, daily, 30 tab(s), 0 Refill(s).          *aspirin 81 mg oral tablet: 81 mg, 1 tab(s), po, daily.          atorvastatin 20 mg oral tablet: 20 mg, 1 tab(s), Oral, daily, 30 tab(s), 0 Refill(s).          hydroCHLOROthiazide 12.5 mg oral capsule: 12.5 mg, 1 cap(s), Oral, daily, 30 cap(s), 0 Refill(s).          losartan 100 mg oral tablet: 1 tab(s), Oral, daily, 30 tab(s), 0 Refill(s).       Problem list:    All Problems  Biceps tendonitis / SNOMED CT 235681872 / Confirmed  Chronic tension headaches / SNOMED CT 417832439 / Confirmed  Dyslipidemia / SNOMED CT 8749113331 / Confirmed  FH: stroke / SNOMED CT 642274098 / Confirmed  Hypertension / SNOMED CT 2708085452 / Confirmed  Hypertriglyceridemia / SNOMED CT 553134319 / Confirmed  Obese / SNOMED CT 7127079749 / Probable  Resolved: CT Cardiac Calcium Score      Histories   Past Medical History:    Active  Biceps tendonitis (925413338): Onset on 2/27/2010 at 54 years.  Comments:  4/20/2011 CDT 10:35 AM CDT - Geovanna Hui.  Hypertriglyceridemia (670688851): Onset on 6/4/2008 at 53 years.  Obese (8190155846)  FH: stroke (835782437)  Dyslipidemia (5703594617)  Chronic tension headaches (333618420)  Resolved  CT Cardiac  Calcium Score:  Resolved.  Comments:  8/20/2014 CDT 1:07 PM CDT - Julia Palencia  Date of test 8/7/14  Score: 141   Family History:       Procedure history:    Prosthetic hemiarthroplasty of joint (SNOMED CT 3064356809) on 4/15/2014 at 58 Years.  Comments:  10/8/2015 6:35 PM CDT - Chema Delacruz PA-C.  left medial knee  Flexible sigmoidoscopy (SNOMED CT 30692677) on 5/20/2011 at 56 Years.  Flexible sigmoidoscopy (SNOMED CT 31535076) on 2/9/2006 at 50 Years.  Arthroscopy of shoulder (SNOMED CT 898720588) on 12/29/1998 at 43 Years.  Comments:  4/20/2011 10:52 AM REBECCAT - Geovanna Hui  Right.  Bunionectomy (SNOMED CT 87530721).  Breast surgery (SNOMED CT 6203476365).  Comments:  4/20/2011 10:47 AM REBECCAT - Geovanna Hui  As a child.   Social History:        Alcohol Assessment            Current, socially      Tobacco Assessment            Former smoker, quit more than 30 days ago      Substance Abuse Assessment            Never      Employment and Education Assessment            Retired      Home and Environment Assessment            Marital status: .  Spouse/Partner name: Mary.  Risks in environment: Does not wear helmet.      Nutrition and Health Assessment            Type of diet: Regular.      Exercise and Physical Activity Assessment            Exercise type: Walking.      Sexual Assessment            Sexually active: Yes.  Sexual orientation: Straight or heterosexual.  Contraceptive Use Details: None.        Physical Examination   Vital Signs   12/18/2019 2:56 PM CST Temperature Tympanic 97.2 DegF  LOW    Peripheral Pulse Rate 68 bpm    HR Method Electronic    Systolic Blood Pressure 156 mmHg  HI    Diastolic Blood Pressure 82 mmHg  HI    Mean Arterial Pressure 107 mmHg    BP Method Electronic      Measurements from flowsheet : Measurements   12/18/2019 2:56 PM CST Height Measured - Standard 73 in    Weight Measured - Standard 253.2 lb    BSA 2.43 m2    Body Mass Index 33.4 kg/m2  HI      General:  Alert  and oriented, No acute distress.    Eye:  Normal conjunctiva.    HENT:  Normocephalic, Tympanic membranes are clear, Oral mucosa is moist, No pharyngeal erythema.    Neck:  Supple, Non-tender, No lymphadenopathy, No thyromegaly.    Respiratory:  Breath sounds are equal, Symmetrical chest wall expansion.         Respirations: Are within normal limits.         Pattern: Regular.         Breath sounds: Bilateral, Within normal limits.    Cardiovascular:  Normal rate, Regular rhythm, No murmur, Normal peripheral perfusion, No edema.    Gastrointestinal:  Soft, Non-tender, Non-distended, Normal bowel sounds, No organomegaly.    Genitourinary:  No costovertebral angle tenderness.    Musculoskeletal:  mild tenderness anterior r ankle.    Integumentary:  Warm, Dry, No rash.       Health Maintenance      Recommendations     Pending (in the next year)        OverDue           Colorectal Cancer Screen (Occult Blood) (Male) due  05/03/13  and every 1  year(s)           Colorectal Cancer Screen (Sigmoidoscopy) (Male) due  05/20/16  and every 5  year(s)           Depression Screen (Male) due  04/17/19  and every 1  year(s)           Influenza Vaccine due  09/01/19  and every 1  year(s)        Due            Hepatitis C Screen 6880-0948 (Male) due  12/18/19  One-time only           Lung Cancer Screen (Male) due  12/18/19  and every 1  year(s)        Due In Future            Lipid Disorders Screen (Male) not due until  06/05/20  and every 1  year(s)           Type 2 Diabetes Mellitus Screen (Male) not due until  06/05/20  and every 1  year(s)     Satisfied (in the past 1 year)        Satisfied            Body Mass Index Check (Male) on  12/18/19.           Body Mass Index Check (Male) on  06/05/19.           Body Mass Index Check (Male) on  02/06/19.           High Blood Pressure Screen (Male) on  12/18/19.           High Blood Pressure Screen (Male) on  06/05/19.           High Blood Pressure Screen (Male) on  06/05/19.            High Blood Pressure Screen (Male) on  02/06/19.           Lipid Disorders Screen (Male) on  06/05/19.           Lipid Disorders Screen (Male) on  06/05/19.           Lipid Disorders Screen (Male) on  06/05/19.           Lipid Disorders Screen (Male) on  06/05/19.           Obesity Screen and Counseling (Male) on  12/18/19.           Obesity Screen and Counseling (Male) on  06/05/19.           Obesity Screen and Counseling (Male) on  02/06/19.           Tobacco Use Screen (Male) on  12/18/19.           Tobacco Use Screen (Male) on  06/05/19.           Tobacco Use Screen (Male) on  02/06/19.           Type 2 Diabetes Mellitus Screen (Male) on  06/05/19.          Review / Management   Results review      Impression and Plan   Diagnosis     Hypertension (ZPY38-OX I10).     Dyslipidemia (ERZ81-ZU E78.5).     Blood Glucose Elevated (DES75-NH R73.09).     Course:  Not progressing as expected.    Plan:  Monitor blood pressure, Weight monitoring, fu 6mo,  BMI,Weight loss,exercise,diet discussed  BP up very stressfull day had been nl  will get bp checked regularly and fu 6 mo.    Patient Instructions:       Counseled: Patient, Regarding diagnosis, Regarding treatment, Regarding medications, Diet.

## 2022-02-15 NOTE — PROGRESS NOTES
Patient:   ALEC VEGA            MRN: [id number]            FIN: [id number]               Age:   63 years     Sex:  Male     :  1955   Associated Diagnoses:   Preoperative exam; Knee osteoarthritis   Author:   Samuel Zacarias MD      Preoperative Information   Indication for surgery:  djd r knee.    Accompanied by:  No one.    Source of history:  Self.    History limitation:  None.       Chief Complaint   2019 9:55 AM CST     Pre-op for Right knee replacement with Dr. Rees  at [facility] on .      Review of Systems   Constitutional:  Negative.    Eye:  Negative.    Ear/Nose/Mouth/Throat:  Negative.    Respiratory:  Negative.    Cardiovascular:  Negative.    Gastrointestinal:  Negative.    Genitourinary:  Negative.    Hematology/Lymphatics:  Negative.    Endocrine:  Negative.    Immunologic:  Negative.    Musculoskeletal:  Joint pain.    Integumentary:  Negative.    Neurologic:  Negative.    Psychiatric:  Negative.    All other systems reviewed and negative      Health Status   Allergies:    Allergic Reactions (Selected)  Severity Not Documented  Latex (Rash)   Problem list:    All Problems  Biceps tendonitis / SNOMED CT 551867034 / Confirmed  Chronic tension headaches / SNOMED CT 963401277 / Confirmed  Dyslipidemia / SNOMED CT 0430257972 / Confirmed  FH: stroke / SNOMED CT 680735862 / Confirmed  Hypertension / SNOMED CT 6571841822 / Confirmed  Hypertriglyceridemia / SNOMED CT 310767582 / Confirmed  Obese / SNOMED CT 2129519473 / Probable  Resolved: CT Cardiac Calcium Score   Medications:  (Selected)   Prescriptions  Prescribed  Lipitor 20 mg oral tablet: = 1 tab(s) ( 20 mg ), PO, Daily, # 90 tab(s), 1 Refill(s), Type: Maintenance, Pharmacy: [facility], 1 tab(s) Oral daily  amLODIPine 10 mg oral tablet: = 1 tab(s) ( 10 mg ), po, daily, # 90 tab(s), 1 Refill(s), Type: Soft Stop, Pharmacy: [facility], 1 tab(s) Oral daily  cyclobenzaprine 10 mg  oral tablet: See Instructions, Instructions: TAKE ONE TABLET BY MOUTH THREE TIMES A DAY, # 30 unknown unit, Type: Soft Stop, Pharmacy: [facility]  hydroCHLOROthiazide 12.5 mg oral capsule: = 1 cap(s) ( 12.5 mg ), po, daily, # 90 cap(s), 1 Refill(s), Type: Maintenance, Pharmacy: [facility], 1 cap(s) Oral daily  losartan 100 mg oral tablet: = 1 tab(s) ( 100 mg ), PO, Daily, # 90 tab(s), 1 Refill(s), Type: Maintenance, Pharmacy: [facility], 1 tab(s) Oral daily  Documented Medications  Documented  aspirin 81 mg oral tablet: 1 tab(s) ( 81 mg ), po, daily, 0 Refill(s), Type: Maintenance,    Medications          *denotes recorded medication          amLODIPine 10 mg oral tablet: 10 mg, 1 tab(s), po, daily, 90 tab(s), 1 Refill(s).          *aspirin 81 mg oral tablet: 81 mg, 1 tab(s), po, daily.          Lipitor 20 mg oral tablet: 20 mg, 1 tab(s), PO, Daily, 90 tab(s), 1 Refill(s).          cyclobenzaprine 10 mg oral tablet: See Instructions, TAKE ONE TABLET BY MOUTH THREE TIMES A DAY, 30 unknown unit.          hydroCHLOROthiazide 12.5 mg oral capsule: 12.5 mg, 1 cap(s), po, daily, 90 cap(s), 1 Refill(s).          losartan 100 mg oral tablet: 100 mg, 1 tab(s), PO, Daily, 90 tab(s), 1 Refill(s).        Histories   Past Medical History:    Active  Biceps tendonitis (853293781): Onset on 2/27/2010 at 54 years.  Comments:  4/20/2011 CDT 10:35 AM CDT - Geovanna Hui.  Hypertriglyceridemia (445753709): Onset on 6/4/2008 at 53 years.  Obese (5088224873)  FH: stroke (445743759)  Dyslipidemia (3490090375)  Chronic tension headaches (074119388)  Resolved  CT Cardiac Calcium Score:  Resolved.  Comments:  8/20/2014 CDT 1:07 PM CDT - Julia Palencia  Date of test 8/7/14  Score: 141   Family History:       Procedure history:    Prosthetic hemiarthroplasty of joint (SNOMED CT 0176274730) on 4/15/2014 at 58 Years.  Comments:  10/8/2015 6:35 PM SAUNDRA Delacruz  Chema LIMA.  left medial knee  Flexible sigmoidoscopy (SNOMED CT 91140036) on 5/20/2011 at 56 Years.  Flexible sigmoidoscopy (SNOMED CT 76184751) on 2/9/2006 at 50 Years.  Arthroscopy of shoulder (SNOMED CT 997171946) on 12/29/1998 at 43 Years.  Comments:  4/20/2011 10:52 AM REBECCARAEGAN Miguel Ez Krystynaan  Right.  Bunionectomy (SNOMED CT 59428903).  Breast surgery (SNOMED CT 1956660664).  Comments:  4/20/2011 10:47 AM Geovanna Esquivel  As a child.   Social History:        Alcohol Assessment            Current, socially      Tobacco Assessment            Former smoker, quit more than 30 days ago      Substance Abuse Assessment            Never      Employment and Education Assessment            Retired      Home and Environment Assessment            Marital status: .  Spouse/Partner name: Mary.  Risks in environment: Does not wear helmet.      Nutrition and Health Assessment            Type of diet: Regular.      Exercise and Physical Activity Assessment            Exercise type: Walking.      Sexual Assessment            Sexually active: Yes.  Sexual orientation: Straight or heterosexual.  Contraceptive Use Details: None.,        Alcohol Assessment            Current, socially      Tobacco Assessment            Former smoker, quit more than 30 days ago      Substance Abuse Assessment            Never      Employment and Education Assessment            Retired      Home and Environment Assessment            Marital status: .  Spouse/Partner name: Mary.  Risks in environment: Does not wear helmet.      Nutrition and Health Assessment            Type of diet: Regular.      Exercise and Physical Activity Assessment            Exercise type: Walking.      Sexual Assessment            Sexually active: Yes.  Sexual orientation: Straight or heterosexual.  Contraceptive Use Details: None.      Physical Examination   Vital Signs   2/6/2019 9:55 AM CST Temperature Tympanic 97.2 DegF  LOW    Peripheral Pulse Rate  66 bpm    Systolic Blood Pressure 127 mmHg    Diastolic Blood Pressure 77 mmHg    Mean Arterial Pressure 94 mmHg      Measurements from flowsheet : Measurements   2/6/2019 9:55 AM CST Height Measured - Standard 73 in    Weight Measured - Standard 249 lb    BSA 2.41 m2    Body Mass Index 32.85 kg/m2  HI      General:  Alert and oriented, No acute distress.    Eye:  Normal conjunctiva.    HENT:  Normocephalic, Tympanic membranes are clear, Oral mucosa is moist, No pharyngeal erythema.    Neck:  Supple, Non-tender, No carotid bruit, No jugular venous distention, No lymphadenopathy, No thyromegaly.    Respiratory:  Breath sounds are equal, Symmetrical chest wall expansion.         Respirations: Are within normal limits.         Pattern: Regular.         Breath sounds: Bilateral, Within normal limits.    Cardiovascular:  Normal rate, Regular rhythm, No murmur, Good pulses equal in all extremities, Normal peripheral perfusion, No edema.    Gastrointestinal:  Soft, Non-tender, Non-distended.    Musculoskeletal:  degenerative changes noted.    Integumentary:  Warm, Dry, Intact, No rash.    Neurologic:  Alert, Oriented.    Psychiatric:  Cooperative, Appropriate mood & affect.       Health Maintenance      Recommendations     Pending (in the next year)        OverDue           Colorectal Cancer Screen (Occult Blood) (Male) due  05/03/13  and every 1  year(s)           Colorectal Cancer Screen (Sigmoidoscopy) (Male) due  05/20/16  and every 5  year(s)        Due            Hepatitis C Screen 1478-3452 (Male) due  02/06/19  One-time only           Influenza Vaccine due  02/06/19  and every 1  year(s)           Lung Cancer Screen (Male) due  02/06/19  and every 1  year(s)        Due In Future            Lipid Disorders Screen (Male) not due until  04/09/19  and every 1  year(s)           Depression Screen (Male) not due until  04/17/19  and every 1  year(s)     Satisfied (in the past 1 year)        Satisfied            Alcohol  Misuse Screen (Male) on  04/17/18.           Body Mass Index Check (Male) on  02/06/19.           Body Mass Index Check (Male) on  04/17/18.           Depression Screen (Male) on  04/17/18.           Depression Screen (Male) on  04/17/18.           Depression Screen (Male) on  04/17/18.           High Blood Pressure Screen (Male) on  02/06/19.           High Blood Pressure Screen (Male) on  10/31/18.           High Blood Pressure Screen (Male) on  10/31/18.           High Blood Pressure Screen (Male) on  08/08/18.           High Blood Pressure Screen (Male) on  08/08/18.           High Blood Pressure Screen (Male) on  04/17/18.           Lipid Disorders Screen (Male) on  04/09/18.           Lipid Disorders Screen (Male) on  04/09/18.           Lipid Disorders Screen (Male) on  04/09/18.           Lipid Disorders Screen (Male) on  04/09/18.           Obesity Screen and Counseling (Male) on  02/06/19.           Obesity Screen and Counseling (Male) on  10/31/18.           Obesity Screen and Counseling (Male) on  04/17/18.           Tobacco Use Screen (Male) on  02/06/19.           Tobacco Use Screen (Male) on  10/31/18.           Tobacco Use Screen (Male) on  04/17/18.        Review / Management   No family history of bleeding tendencies, thrombophilias, or anesthesia complications.  No personal history of bleeding tendencies, thrombophilias, anesthesia complications, or valvular disease.      Impression and Plan   Diagnosis     Preoperative exam (YWI05-TS Z01.812).     Knee osteoarthritis (PWZ30-YN M17.9).     Condition:  Stable, Procede with procedure/surgery. ASA Class 2.    Orders     Informed patient to avoid aspirin and ibuprofen type products 10 days prior to surgery.

## 2022-02-15 NOTE — PROGRESS NOTES
Patient:   ALEC VEGA            MRN: [id number]            FIN: [id number]               Age:   64 years     Sex:  Male     :  1955   Associated Diagnoses:   Hypertension; Dyslipidemia; Blood Glucose Elevated   Author:   Samuel Zacarias MD      Visit Information      Date of Service: 2019 12:56 pm  Performing Location: [facility]  Encounter#: [id number]      Primary Care Provider (PCP):  Samuel Zacarias MD    NPI# [id number]      Referring Provider:  Samuel Zacarias MD# [id number]      Chief Complaint   2019 12:59 PM CDT    HTN check up        History of Present Illness             The patient presents for follow-up evaluation of hypertension.  The quality of hypertension symptom(s) since the patient's last visit is described as being unchanged.  Exacerbating factors consist of noncompliance with diet.  Relieving factors consist of medication.  Associated symptoms consist of none.     chief complaint and symptoms as noted above confirmed with patient   meds as directed rare use of flexeril for back      Review of Systems   Constitutional:  Negative.    Eye:  Negative.    Ear/Nose/Mouth/Throat:  Negative.    Respiratory:  Negative.    Cardiovascular:  Negative.    Gastrointestinal:  Negative.    Genitourinary:  Negative.    Hematology/Lymphatics:  Negative.    Endocrine:  Negative.    Immunologic:  Negative.    Musculoskeletal:  Negative except as documented in history of present illness.    Integumentary:  Negative.    Neurologic:  Negative.       Health Status   Allergies:    Canceled/Inactive Reactions (Selected)  No known allergies   Medications:  (Selected)   Prescriptions  Prescribed  amLODIPine 10 mg oral tablet: = 1 tab(s), Oral, daily, # 30 tab(s), 0 Refill(s), Type: Maintenance, Pharmacy: [facility], One month protocol  atorvastatin 20 mg oral tablet: 1 tab(s), Oral, daily, # 30 unknown unit, Type: Soft Stop, Pharmacy: [facility]  [facility]  cyclobenzaprine 10 mg oral tablet: See Instructions, Instructions: TAKE ONE TABLET BY MOUTH THREE TIMES A DAY, # 30 unknown unit, Type: Soft Stop, Pharmacy: [facility]  hydroCHLOROthiazide 12.5 mg oral capsule: 1 cap(s), Oral, daily, # 30 unknown unit, Type: Soft Stop, Pharmacy: [facility]  losartan 100 mg oral tablet: = 1 tab(s) ( 100 mg ), PO, Daily, # 90 tab(s), 1 Refill(s), Type: Maintenance, Pharmacy: [facility], 1 tab(s) Oral daily  Documented Medications  Documented  aspirin 81 mg oral tablet: 1 tab(s) ( 81 mg ), po, daily, 0 Refill(s), Type: Maintenance,    Medications          *denotes recorded medication          amLODIPine 10 mg oral tablet: 1 tab(s), Oral, daily, 30 tab(s), 0 Refill(s).          *aspirin 81 mg oral tablet: 81 mg, 1 tab(s), po, daily.          atorvastatin 20 mg oral tablet: 1 tab(s), Oral, daily, 30 unknown unit.          cyclobenzaprine 10 mg oral tablet: See Instructions, TAKE ONE TABLET BY MOUTH THREE TIMES A DAY, 30 unknown unit.          hydroCHLOROthiazide 12.5 mg oral capsule: 1 cap(s), Oral, daily, 30 unknown unit.          losartan 100 mg oral tablet: 100 mg, 1 tab(s), PO, Daily, 90 tab(s), 1 Refill(s).     Problem list:    All Problems  Biceps tendonitis / SNOMED CT 780144674 / Confirmed  Chronic tension headaches / SNOMED CT 310866663 / Confirmed  Dyslipidemia / SNOMED CT 7358730713 / Confirmed  FH: stroke / SNOMED CT 314320647 / Confirmed  Hypertension / SNOMED CT 7144176489 / Confirmed  Hypertriglyceridemia / SNOMED CT 691918117 / Confirmed  Obese / SNOMED CT 3421487079 / Probable  Resolved: CT Cardiac Calcium Score      Histories   Past Medical History:    Active  Biceps tendonitis (682088548): Onset on 2/27/2010 at 54 years.  Comments:  4/20/2011 CDT 10:35 AM CDT - Geovanna Hui.  Hypertriglyceridemia (264376397): Onset on 6/4/2008 at 53 years.  Obese  (1877316446)  FH: stroke (332813714)  Dyslipidemia (5466401374)  Chronic tension headaches (705376084)  Resolved  CT Cardiac Calcium Score:  Resolved.  Comments:  8/20/2014 CDT 1:07 PM CDT - Julia Palencia  Date of test 8/7/14  Score: 141   Family History:       Procedure history:    Prosthetic hemiarthroplasty of joint (SNOMED CT 1592295329) on 4/15/2014 at 58 Years.  Comments:  10/8/2015 6:35 PM CDT - Chema Delacruz PA-C.  left medial knee  Flexible sigmoidoscopy (SNOMED CT 91062697) on 5/20/2011 at 56 Years.  Flexible sigmoidoscopy (SNOMED CT 56982142) on 2/9/2006 at 50 Years.  Arthroscopy of shoulder (SNOMED CT 186382144) on 12/29/1998 at 43 Years.  Comments:  4/20/2011 10:52 AM CDT - Geovanna Hui  Right.  Bunionectomy (SNOMED CT 91803504).  Breast surgery (SNOMED CT 9232889647).  Comments:  4/20/2011 10:47 AM T - Geovanna Hui  As a child.   Social History:        Alcohol Assessment            Current, socially      Tobacco Assessment            Former smoker, quit more than 30 days ago      Substance Abuse Assessment            Never      Employment and Education Assessment            Retired      Home and Environment Assessment            Marital status: .  Spouse/Partner name: Mary.  Risks in environment: Does not wear helmet.      Nutrition and Health Assessment            Type of diet: Regular.      Exercise and Physical Activity Assessment            Exercise type: Walking.      Sexual Assessment            Sexually active: Yes.  Sexual orientation: Straight or heterosexual.  Contraceptive Use Details: None.      Physical Examination   Vital Signs   6/5/2019 1:02 PM CDT Peripheral Pulse Rate 75 bpm    Systolic Blood Pressure 132 mmHg  HI    Diastolic Blood Pressure 72 mmHg    Mean Arterial Pressure 92 mmHg   6/5/2019 12:59 PM CDT Temperature Tympanic 98.3 DegF    Peripheral Pulse Rate 74 bpm    HR Method Electronic    Systolic Blood Pressure 142 mmHg  HI    Diastolic Blood Pressure 75  mmHg    Mean Arterial Pressure 97 mmHg    BP Method Electronic      Measurements from flowsheet : Measurements   6/5/2019 12:59 PM CDT Height Measured - Standard 73 in    Weight Measured - Standard 253.6 lb    BSA 2.43 m2    Body Mass Index 33.45 kg/m2  HI      General:  Alert and oriented, No acute distress.    Eye:  Normal conjunctiva.    HENT:  Normocephalic, Tympanic membranes are clear, Oral mucosa is moist, No pharyngeal erythema.    Neck:  Supple, Non-tender, No lymphadenopathy, No thyromegaly.    Respiratory:  Breath sounds are equal, Symmetrical chest wall expansion.         Respirations: Are within normal limits.         Pattern: Regular.         Breath sounds: Bilateral, Within normal limits.    Cardiovascular:  Normal rate, Regular rhythm, No murmur, Normal peripheral perfusion, No edema.    Gastrointestinal:  Soft, Non-tender, Non-distended, Normal bowel sounds, No organomegaly.    Genitourinary:  No costovertebral angle tenderness.    Musculoskeletal:  mild tenderness anterior r ankle.    Integumentary:  Warm, Dry, No rash.       Health Maintenance      Recommendations     Pending (in the next year)        OverDue           Colorectal Cancer Screen (Occult Blood) (Male) due  05/03/13  and every 1  year(s)           Colorectal Cancer Screen (Sigmoidoscopy) (Male) due  05/20/16  and every 5  year(s)           Lipid Disorders Screen (Male) due  04/09/19  and every 1  year(s)           Depression Screen (Male) due  04/17/19  and every 1  year(s)        Due            Hepatitis C Screen 5300-3969 (Male) due  06/05/19  One-time only           Lung Cancer Screen (Male) due  06/05/19  and every 1  year(s)        Due In Future            Influenza Vaccine not due until  09/01/19  and every 1  year(s)     Satisfied (in the past 1 year)        Satisfied            Body Mass Index Check (Male) on  06/05/19.           Body Mass Index Check (Male) on  02/06/19.           High Blood Pressure Screen (Male) on   06/05/19.           High Blood Pressure Screen (Male) on  06/05/19.           High Blood Pressure Screen (Male) on  02/06/19.           High Blood Pressure Screen (Male) on  10/31/18.           High Blood Pressure Screen (Male) on  10/31/18.           High Blood Pressure Screen (Male) on  08/08/18.           High Blood Pressure Screen (Male) on  08/08/18.           Obesity Screen and Counseling (Male) on  06/05/19.           Obesity Screen and Counseling (Male) on  02/06/19.           Obesity Screen and Counseling (Male) on  10/31/18.           Tobacco Use Screen (Male) on  06/05/19.           Tobacco Use Screen (Male) on  02/06/19.           Tobacco Use Screen (Male) on  10/31/18.        Review / Management   Results review      Impression and Plan   Diagnosis     Hypertension (SIS72-RB I10).     Dyslipidemia (TGF07-UJ E78.5).     Blood Glucose Elevated (MFQ06-EX R73.09).     Course:  Progressing as expected.    Plan:  Monitor blood pressure, Weight monitoring, fu 6mo,  BMI,Weight loss,exercise,diet discussed  6 mo fu.    Patient Instructions:       Counseled: Patient, Regarding diagnosis, Regarding treatment, Regarding medications, Diet.

## 2022-02-15 NOTE — NURSING NOTE
Depression Screening Entered On:  7/21/2020 1:03 PM CDT    Performed On:  7/21/2020 1:02 PM CDT by Julia Palencia               Depression Screening   Little Interest - Pleasure in Activities :   Not at all   Feeling Down, Depressed, Hopeless :   Not at all   Initial Depression Screen Score :   0 Score   Poor Appetite or Overeating :   Not at all   Trouble Falling or Staying Asleep :   Not at all   Feeling Tired or Little Energy :   Not at all   Feeling Bad About Yourself :   Not at all   Trouble Concentrating :   Not at all   Moving or Speaking Slowly :   Not at all   Thoughts Better Off Dead or Hurting Self :   Not at all   GEOVANNI Difficulty with Work, Home, Others :   Not difficult at all   Detailed Depression Screen Score :   0    Total Depression Screen Score :   0    uJlia Palencia - 7/21/2020 1:02 PM CDT

## 2022-02-15 NOTE — TELEPHONE ENCOUNTER
Entered by Julia Palencia on June 28, 2020 8:23:54 AM CDT  ---------------------  From: Julia Palencia   To: [facility] [place]    Sent: 6/28/2020 8:23:53 AM CDT  Subject: Medication Management     ** Submitted: **  Order:losartan (losartan 100 mg oral tablet)  1 tab(s)  Oral  daily  Qty:  30 tab(s)        Refills:  0          Substitutions Allowed     [facility] To [facility] - [facility] [place]    Signed by Julia Palencia  6/28/2020 1:23:00 PM [facility]    ** Submitted: **  Complete:losartan (losartan 100 mg oral tablet)   Signed by Julia Palencia  6/28/2020 1:23:00 PM [facility]    ** Not Approved:  **  losartan (LOSARTAN POTASSIUM 100MG TABS)  TAKE ONE TABLET BY MOUTH EVERY DAY  Qty:  30 unknown unit        Days Supply:  30        Refills:  5          Substitutions Allowed     Route To Pharmacy - [facility] [place]   Signed by Julia Palencia            ** Submitted: **  Order:hydroCHLOROthiazide (hydroCHLOROthiazide 12.5 mg oral capsule)  1 cap(s)  Oral  daily  Qty:  30 tab(s)        Refills:  0          Substitutions Allowed     Route To Pharmacy - [facility] [place]    Signed by Julia Palencia  6/28/2020 1:23:00 PM [facility]    ** Submitted: **  Complete:hydroCHLOROthiazide (hydroCHLOROthiazide 12.5 mg oral capsule)   Signed by Julia Palencia  6/28/2020 1:23:00 PM [facility]    ** Not Approved:  **  hydroCHLOROthiazide (HYDROCHLOROTHIAZIDE 12.5MG CAPS)  TAKE ONE CAPSULE BY MOUTH EVERY DAY  Qty:  90 unknown unit        Days Supply:  90        Refills:  1          Substitutions Allowed     Route To Pharmacy - [facility] [place]   Signed by Julia Palencia            ** Submitted: **  Order:amLODIPine (amLODIPine 10 mg oral tablet)  1 tab(s)  Oral  daily  Qty:  30 tab(s)        Refills:  0          Substitutions Allowed     Route To Pharmacy - [facility] [place]    Signed by Julia Palencia  6/28/2020 1:22:00 PM  [facility]    ** Submitted: **  Complete:amLODIPine (amLODIPine 10 mg oral tablet)   Signed by Julia Palencia  6/28/2020 1:23:00 PM [facility]    ** Not Approved:  **  amLODIPine (AMLODIPINE BESYLATE 10MG TABS)  TAKE ONE TABLET BY MOUTH EVERY DAY  Qty:  30 unknown unit        Days Supply:  30        Refills:  5          Substitutions Allowed     Route To Pharmacy - [facility]   Signed by Julia Palencia          Med Refill      Date of last office visit and reason:  12/18/19 for chronic disease visit with TFS      Date of last Med Check / Px:   12/18/19  Date of last labs pertaining to med:  BMP 2/6/19    RTC order in chart:  Yes.  Due now.  will fill 1 month     For Protocol refill, has patient been contacted:  _      ------------------------------------------  From: [facility]  To: Samuel Zacarias MD  Sent: June 26, 2020 10:20:59 AM CDT  Subject: Medication Management  Due: June 24, 2020 10:12:23 PM CDT     ** On Hold Pending Signature **     Drug: amLODIPine (amLODIPine 10 mg oral tablet), TAKE ONE TABLET BY MOUTH EVERY DAY  Quantity: 30 unknown unit  Days Supply: 30  Refills: 5  Substitutions Allowed  Notes from Pharmacy:     Dispensed Drug: amLODIPine (amLODIPine 10 mg oral tablet), TAKE ONE TABLET BY MOUTH EVERY DAY  Quantity: 30 unknown unit  Days Supply: 30  Refills: 5  Substitutions Allowed  Notes from Pharmacy:     ** On Hold Pending Signature **     Drug: losartan (losartan 100 mg oral tablet), TAKE ONE TABLET BY MOUTH EVERY DAY  Quantity: 30 unknown unit  Days Supply: 30  Refills: 5  Substitutions Allowed  Notes from Pharmacy:     Dispensed Drug: losartan (losartan 100 mg oral tablet), TAKE ONE TABLET BY MOUTH EVERY DAY  Quantity: 30 unknown unit  Days Supply: 30  Refills: 5  Substitutions Allowed  Notes from Pharmacy:     ** On Hold Pending Signature **     Drug: hydroCHLOROthiazide (hydroCHLOROthiazide 12.5 mg oral capsule), TAKE ONE CAPSULE BY MOUTH EVERY  DAY  Quantity: 90 unknown unit  Days Supply: 90  Refills: 1  Substitutions Allowed  Notes from Pharmacy:     Dispensed Drug: hydroCHLOROthiazide (hydroCHLOROthiazide 12.5 mg oral capsule), TAKE ONE CAPSULE BY MOUTH EVERY DAY  Quantity: 90 unknown unit  Days Supply: 90  Refills: 1  Substitutions Allowed  Notes from Pharmacy:  ------------------------------------------

## 2022-02-15 NOTE — TELEPHONE ENCOUNTER
---------------------  From: Samuel Zacarias MD   To: [facility] Message Pool ([id number] - [place]);     Sent: 3/17/2020 10:41:19 AM CDT  Subject: General Message     ok to renew meds 3 mo and fu then for visit instead of fridayCalled and informed pt that appt has been cancelled. He agrees with this. it looks like he has enough refills until June. Will not send any right nowHe will call back later on to r/s.

## 2022-02-15 NOTE — NURSING NOTE
Tyesha Fall Risk Scale Score Entered On:  7/21/2020 12:58 PM CDT    Performed On:  7/21/2020 12:58 PM CDT by Julia Palencia Fall Risk   History of Fall in Last 3 Months Tyesha :   No   Julia Palencia - 7/21/2020 12:58 PM CDT

## 2022-02-15 NOTE — TELEPHONE ENCOUNTER
Entered by Mya Núñez CMA on December 30, 2019 8:46:00 AM CST  ---------------------  From: Mya Núñez CMA   To: [facility]    Sent: 12/30/2019 8:46:00 AM CST  Subject: Medication Management     ** Not Approved: Patient has requested refill too soon, 6 month supply sent 12/18/19 **  atorvastatin (ATORVASTATIN CALCIUM 20MG TABS)  TAKE ONE TABLET BY MOUTH EVERY DAY  Qty:  30 unknown unit        Days Supply:  30        Refills:  0          Substitutions Allowed     Route To Pharmacy - [facility]   Signed by Mya Núñez CMA            ------------------------------------------  From: [facility]  To: Samuel Zacarias MD  Sent: December 29, 2019 10:15:27 AM CST  Subject: Medication Management  Due: December 30, 2019 10:15:27 AM CST    ** On Hold Pending Signature **  Drug: atorvastatin (atorvastatin 20 mg oral tablet)  TAKE ONE TABLET BY MOUTH EVERY DAY  Quantity: 30 unknown unit  Days Supply: 30  Refills: 0  Substitutions Allowed  Notes from Pharmacy:     Dispensed Drug: atorvastatin (atorvastatin 20 mg oral tablet)  TAKE ONE TABLET BY MOUTH EVERY DAY  Quantity: 30 unknown unit  Days Supply: 30  Refills: 0  Substitutions Allowed  Notes from Pharmacy:   ------------------------------------------

## 2022-02-15 NOTE — PROGRESS NOTES
Patient:   ALEC VEGA            MRN: [id number]            FIN: [id number]               Age:   66 years     Sex:  Male     :  1955   Associated Diagnoses:   Hypertension; Dyslipidemia; Blood Glucose Elevated   Author:   Samuel Zacarias MD      Visit Information      Date of Service: 2021 01:46 pm  Performing Location: [facility]  Encounter#: [id number]      Primary Care Provider (PCP):  Samuel Zacarias MD    NPI# [id number]      Referring Provider:  Samuel Zacarias MD# [id number]      Chief Complaint   2021 2:02 PM CDT    Chronic disease visit: HTN        History of Present Illness             The patient presents for follow-up evaluation of hypertension.  The quality of hypertension symptom(s) since the patient's last visit is described as being unchanged.  Exacerbating factors consist of noncompliance with diet.  Relieving factors consist of medication.  Associated symptoms consist of none.     chief complaint and symptoms as noted above confirmed with patient   meds as directed rare use of flexeril for back         Review of Systems   Constitutional:  Negative.    Eye:  Negative.    Ear/Nose/Mouth/Throat:  Negative.    Respiratory:  Negative.    Cardiovascular:  Negative.    Gastrointestinal:  Negative.    Genitourinary:  Negative.    Hematology/Lymphatics:  Negative.    Endocrine:  Negative.    Immunologic:  Negative.    Musculoskeletal:  Negative except as documented in history of present illness.    Integumentary:  Negative.    Neurologic:  Negative.       Health Status   Allergies:    Allergic Reactions (Selected)  Severity Not Documented  Latex (Rash)  No Known Medication Allergies   Medications:  (Selected)   Prescriptions  Prescribed  amLODIPine 10 mg oral tablet: = 1 tab(s), Oral, daily, # 90 tab(s), 1 Refill(s), Type: Maintenance, Pharmacy: [facility], 1 tab(s) Oral daily, 73, in, 21 9:58:00 CST, Height Measured,  261.1, lb, 02/18/21 9:58:00 CST, Weight Measured  aspirin 81 mg oral tablet: = 1 tab(s) ( 81 mg ), po, daily, # 30 tab(s), 11 Refill(s), Type: Maintenance, OTC (Rx)  atorvastatin 20 mg oral tablet: = 1 tab(s), Oral, daily, # 90 tab(s), 1 Refill(s), Type: Maintenance, Pharmacy: Pending sale to [facility], 1 tab(s) Oral daily, 73, in, 02/18/21 9:58:00 CST, Height Measured, 261.1, lb, 02/18/21 9:58:00 CST, Weight Measured  cyclobenzaprine 10 mg oral tablet: = 1 tab(s), Oral, tid, # 30 unknown unit, 1 Refill(s), Type: Acute, Pharmacy: Pending sale to [facility], TAKE ONE TABLET BY MOUTH THREE TIMES A DAY, 73, in, 02/18/21 10:33:00 CST, Height Measured, 261.1, lb, 02/18/21 9:58:00 CST, Weight Carlos...  hydroCHLOROthiazide 12.5 mg oral capsule: = 1 cap(s), Oral, daily, # 90 cap(s), 1 Refill(s), Type: Maintenance, Pharmacy: Pending sale to [facility], 1 cap(s) Oral daily, 73, in, 02/18/21 9:58:00 CST, Height Measured, 261.1, lb, 02/18/21 9:58:00 CST, Weight Measured  losartan 100 mg oral tablet: = 1 tab(s), Oral, daily, # 90 tab(s), 1 Refill(s), Type: Maintenance, Pharmacy: Pending sale to [facility], 1 tab(s) Oral daily, 73, in, 02/18/21 9:58:00 CST, Height Measured, 261.1, lb, 02/18/21 9:58:00 CST, Weight Measured,    Medications          *denotes recorded medication          amLODIPine 10 mg oral tablet: 1 tab(s), Oral, daily, 90 tab(s), 1 Refill(s).          aspirin 81 mg oral tablet: 81 mg, 1 tab(s), po, daily, 30 tab(s), 11 Refill(s).          atorvastatin 20 mg oral tablet: 1 tab(s), Oral, daily, 90 tab(s), 1 Refill(s).          cyclobenzaprine 10 mg oral tablet: 1 tab(s), Oral, tid, 30 unknown unit, 1 Refill(s).          hydroCHLOROthiazide 12.5 mg oral capsule: 1 cap(s), Oral, daily, 90 cap(s), 1 Refill(s).          losartan 100 mg oral tablet: 1 tab(s), Oral, daily, 90 tab(s), 1 Refill(s).       Problem list:    All Problems (Selected)  Hypertriglyceridemia / SNOMED CT 014198391  / Confirmed  FH: stroke / SNOMED CT 771369980 / Confirmed  Obese / SNOMED CT 4193279794 / Probable  Hypertension / SNOMED CT 4122914761 / Confirmed  Dyslipidemia / SNOMED CT 7463395245 / Confirmed      Histories   Past Medical History:    Active  Hypertriglyceridemia (370256863): Onset on 6/4/2008 at 53 years.  Obese (2690186913)  FH: stroke (190954137)  Dyslipidemia (1809979973)  Resolved  CT Cardiac Calcium Score:  Resolved.  Comments:  8/20/2014 CDT 1:07 PM CDT - Radu Julia ANN  Date of test 8/7/14  Score: 141   Family History:       Procedure history:    Screening for malignant neoplasm of colon (SNOMED CT 7532876174) on 1/13/2020 at 64 Years.  Comments:  1/17/2020 8:47 AM CST - Rula Garcia - Negative  Prosthetic hemiarthroplasty of joint (SNOMED CT 2319149004) on 4/15/2014 at 58 Years.  Comments:  10/8/2015 6:35 PM CDT - Chema Delacruz PA-C.  left medial knee  Flexible sigmoidoscopy (SNOMED CT 92600852) on 5/20/2011 at 56 Years.  Flexible sigmoidoscopy (SNOMED CT 50108238) on 2/9/2006 at 50 Years.  Arthroscopy of shoulder (SNOMED CT 770073655) on 12/29/1998 at 43 Years.  Comments:  4/20/2011 10:52 AM REBECCAT - Geovanna Hui  Right.  Bunionectomy (SNOMED CT 82532045).  Breast surgery (SNOMED CT 9745907873).  Comments:  4/20/2011 10:47 AM REBECCAT - Geovanna Hui  As a child.   Social History:        Electronic Cigarette/Vaping Assessment            Electronic Cigarette Use: Never.      Alcohol Assessment            Current, socially      Tobacco Assessment            Former smoker, quit more than 30 days ago      Substance Abuse Assessment            Never      Employment and Education Assessment            Retired      Home and Environment Assessment            Marital status: .  Spouse/Partner name: Mary.  Risks in environment: Does not wear helmet.      Nutrition and Health Assessment            Type of diet: Regular.      Exercise and Physical Activity Assessment            Exercise  type: Walking.      Sexual Assessment            Sexually active: Yes.  Sexual orientation: Straight or heterosexual.  Contraceptive Use Details: None.        Physical Examination   Vital Signs   8/20/2021 2:02 PM CDT Temperature Tympanic 98.7 DegF    Peripheral Pulse Rate 61 bpm    HR Method Electronic    Systolic Blood Pressure 138 mmHg  HI    Diastolic Blood Pressure 80 mmHg    Mean Arterial Pressure 99 mmHg    BP Method Electronic      Measurements from flowsheet : Measurements   8/20/2021 2:02 PM CDT Height Measured - Standard 73 in    Weight Measured - Standard 264.5 lb    BSA 2.48 m2    Body Mass Index 34.89 kg/m2  HI      General:  Alert and oriented, No acute distress.    Eye:  Normal conjunctiva.    HENT:  Normocephalic.    Neck:  Supple, Non-tender, No lymphadenopathy, No thyromegaly.    Respiratory:  Breath sounds are equal, Symmetrical chest wall expansion.         Respirations: Are within normal limits.         Pattern: Regular.         Breath sounds: Bilateral, Within normal limits.    Cardiovascular:  Normal rate, Regular rhythm, No murmur, Normal peripheral perfusion, No edema.    Musculoskeletal:  degenerative changes noted.    Integumentary:  Warm, Dry, No rash.       Health Maintenance      Recommendations     Pending (in the next year)        OverDue           Lipid Disorders Screen due  07/07/21  and every 1  year(s)           Type 2 Diabetes Mellitus Screen due  07/07/21  and every 1  year(s)        Due            Fall Risk Screen due  07/21/21  and every 1  year(s)           Depression Screen due  07/21/21  and every 1  year(s)           Abdominal Aortic Aneurysm Screen (if hx of smoking) due  08/20/21  One-time only           Hepatitis C Screen 9095-6773 due  08/20/21  One-time only           Lung Cancer Screen due  08/20/21  and every 1  year(s)        Near Due            Influenza Vaccine near due  09/01/21  and every 1  year(s)     Satisfied (in the past 1 year)        Satisfied             Body Mass Index Check on  08/20/21.           Body Mass Index Check on  06/09/21.           Body Mass Index Check on  06/06/21.           Body Mass Index Check on  02/18/21.           COVID-19 Vaccine (Moderna) Dose 2 on  04/07/21.           COVID-19 Vaccine (Moderna) Dose 1 on  03/10/21.           High Blood Pressure Screen on  08/20/21.           High Blood Pressure Screen on  06/09/21.           High Blood Pressure Screen on  06/09/21.           High Blood Pressure Screen on  06/06/21.           High Blood Pressure Screen on  02/18/21.           High Blood Pressure Screen on  02/18/21.           Obesity Screen and Counseling on  08/20/21.           Obesity Screen and Counseling on  06/09/21.           Obesity Screen and Counseling on  06/06/21.           Obesity Screen and Counseling on  02/18/21.           Tobacco Use Screen on  08/20/21.           Tobacco Use Screen on  06/09/21.           Tobacco Use Screen on  02/18/21.          Review / Management   Results review      Impression and Plan   Diagnosis     Hypertension (ZPS05-LK I10).     Dyslipidemia (UKA01-ZF E78.5).     Blood Glucose Elevated (RWA60-EP R73.09).     Course:  Progressing as expected.    Plan:  1.  Hypertension under reasonable control with amlodipine hydrochlorothiazide and losartan encouraged him to get a blood pressure cuff at home and to track his blood pressures bring it in with him and we will see where we are in 6 months if he continues to be elevated will look at increasing his hydrochlorothiazide.     2.  Hyperlipidemia stable on atorvastatin No. 3 chronic back problems with occasional cyclobenzaprine   .    Patient Instructions:       Counseled: Patient, Regarding diagnosis, Regarding treatment, Regarding medications, Diet.

## 2022-02-15 NOTE — PROGRESS NOTES
Patient:   ALEC VEGA            MRN: [id number]            FIN: [id number]               Age:   65 years     Sex:  Male     :  1955   Associated Diagnoses:   Hypertension; Dyslipidemia; Blood Glucose Elevated   Author:   Samuel Zacarias MD      Visit Information      Date of Service: 2020 12:49 pm  Performing Location: [facility]  Encounter#: [id number]      Primary Care Provider (PCP):  Samuel Zacarias MD    NPI# [id number]      Referring Provider:  Samuel Zacarias MD# [id number]      Chief Complaint   2020 12:56 PM CDT   Chronic disease visit.        History of Present Illness             The patient presents for follow-up evaluation of hypertension.  The quality of hypertension symptom(s) since the patient's last visit is described as being unchanged.  Exacerbating factors consist of noncompliance with diet.  Relieving factors consist of medication.  Associated symptoms consist of none.     chief complaint and symptoms as noted above confirmed with patient   meds as directed rare use of flexeril for back      Review of Systems   Constitutional:  Negative.    Eye:  Negative.    Ear/Nose/Mouth/Throat:  Negative.    Respiratory:  Negative.    Cardiovascular:  Negative.    Gastrointestinal:  Negative.    Genitourinary:  Negative.    Hematology/Lymphatics:  Negative.    Endocrine:  Negative.    Immunologic:  Negative.    Musculoskeletal:  Negative except as documented in history of present illness.    Integumentary:  Negative.    Neurologic:  Negative.       Health Status   Allergies:    Allergic Reactions (Selected)  Severity Not Documented  Latex (Rash)  No Known Medication Allergies   Medications:  (Selected)   Prescriptions  Prescribed  amLODIPine 10 mg oral tablet: = 1 tab(s), Oral, daily, # 90 tab(s), 1 Refill(s), Type: Maintenance, Pharmacy: [facility], 1 tab(s) Oral daily, 73, in, 20 12:56:00 CDT, Height Measured, Weight  Measured  aspirin 81 mg oral tablet: = 1 tab(s) ( 81 mg ), po, daily, # 30 tab(s), 11 Refill(s), Type: Maintenance, OTC (Rx)  atorvastatin 20 mg oral tablet: = 1 tab(s) ( 20 mg ), Oral, daily, # 90 tab(s), 1 Refill(s), Type: Maintenance, Pharmacy: [facility], 1 tab(s) Oral daily, 73, in, 07/21/20 12:56:00 CDT, Height Measured, 259.8, lb, 07/21/20 12:56:00 CDT, Weight Measured  cyclobenzaprine 10 mg oral tablet: = 1 tab(s) ( 10 mg ), Oral, tid, # 30 tab(s), 1 Refill(s), Type: Soft Stop, Pharmacy: [facility], 1 tab(s) Oral tid  hydroCHLOROthiazide 12.5 mg oral capsule: = 1 cap(s), Oral, daily, # 90 tab(s), 1 Refill(s), Type: Maintenance, Pharmacy: [facility], 1 cap(s) Oral daily, 73, in, 07/21/20 12:56:00 CDT, Height Measured, Weight Measured  losartan 100 mg oral tablet: = 1 tab(s), Oral, daily, # 90 tab(s), 1 Refill(s), Type: Maintenance, Pharmacy: [facility], 1 tab(s) Oral daily, 73, in, 07/21/20 12:56:00 CDT, Height Measured, Weight Measured,    Medications          *denotes recorded medication          amLODIPine 10 mg oral tablet: 1 tab(s), Oral, daily, 90 tab(s), 1 Refill(s).          aspirin 81 mg oral tablet: 81 mg, 1 tab(s), po, daily, 30 tab(s), 11 Refill(s).          atorvastatin 20 mg oral tablet: 20 mg, 1 tab(s), Oral, daily, 90 tab(s), 1 Refill(s).          cyclobenzaprine 10 mg oral tablet: 10 mg, 1 tab(s), Oral, tid, 30 tab(s), 1 Refill(s).          hydroCHLOROthiazide 12.5 mg oral capsule: 1 cap(s), Oral, daily, 90 tab(s), 1 Refill(s).          losartan 100 mg oral tablet: 1 tab(s), Oral, daily, 90 tab(s), 1 Refill(s).       Problem list:    All Problems (Selected)  Biceps tendonitis / SNOMED CT 293594281 / Confirmed  Chronic tension headaches / SNOMED CT 871857788 / Confirmed  Dyslipidemia / SNOMED CT 6022230104 / Confirmed  FH: stroke / SNOMED CT 920873564 / Confirmed  Hypertension / SNOMED CT  0717145755 / Confirmed  Hypertriglyceridemia / SNOMED CT 859724992 / Confirmed  Obese / SNOMED CT 9706885778 / Probable      Histories   Past Medical History:    Active  Biceps tendonitis (199925647): Onset on 2/27/2010 at 54 years.  Comments:  4/20/2011 CDT 10:35 AM CDT - Geovanna Hui  Acute.  Hypertriglyceridemia (732722405): Onset on 6/4/2008 at 53 years.  Obese (5176564072)  FH: stroke (121434916)  Dyslipidemia (4411925317)  Chronic tension headaches (405862288)  Resolved  CT Cardiac Calcium Score:  Resolved.  Comments:  8/20/2014 CDT 1:07 PM CDT - Julia Palencia  Date of test 8/7/14  Score: 141   Family History:       Procedure history:    Screening for malignant neoplasm of colon (SNOMED CT 2557705556) on 1/13/2020 at 64 Years.  Comments:  1/17/2020 8:47 AM CST - Rula Garcia - Negative  Prosthetic hemiarthroplasty of joint (SNOMED CT 2898131931) on 4/15/2014 at 58 Years.  Comments:  10/8/2015 6:35 PM CDT - Chema Delacruz PA-C.  left medial knee  Flexible sigmoidoscopy (SNOMED CT 68984518) on 5/20/2011 at 56 Years.  Flexible sigmoidoscopy (SNOMED CT 19246422) on 2/9/2006 at 50 Years.  Arthroscopy of shoulder (SNOMED CT 369626188) on 12/29/1998 at 43 Years.  Comments:  4/20/2011 10:52 AM REBECCAT - Geovanna Hui  Right.  Bunionectomy (SNOMED CT 78808323).  Breast surgery (SNOMED CT 0816815944).  Comments:  4/20/2011 10:47 AM SAUNDRA - Geovanna Hui  As a child.   Social History:        Alcohol Assessment            Current, socially      Tobacco Assessment            Former smoker, quit more than 30 days ago      Substance Abuse Assessment            Never      Employment and Education Assessment            Retired      Home and Environment Assessment            Marital status: .  Spouse/Partner name: Mary.  Risks in environment: Does not wear helmet.      Nutrition and Health Assessment            Type of diet: Regular.      Exercise and Physical Activity Assessment            Exercise  type: Walking.      Sexual Assessment            Sexually active: Yes.  Sexual orientation: Straight or heterosexual.  Contraceptive Use Details: None.        Physical Examination   Vital Signs   7/21/2020 12:56 PM CDT Temperature Tympanic 98.1 DegF    Peripheral Pulse Rate 76 bpm    HR Method Electronic    Systolic Blood Pressure 135 mmHg  HI    Diastolic Blood Pressure 78 mmHg    Mean Arterial Pressure 97 mmHg    BP Method Electronic      Measurements from flowsheet : Measurements   7/21/2020 12:56 PM CDT Height Measured - Standard 73 in    Weight Measured - Standard 259.8 lb    BSA 2.46 m2    Body Mass Index 34.27 kg/m2  HI      General:  Alert and oriented, No acute distress.    Eye:  Normal conjunctiva.    HENT:  Normocephalic, Tympanic membranes are clear, Oral mucosa is moist, No pharyngeal erythema.    Neck:  Supple, Non-tender, No lymphadenopathy, No thyromegaly.    Respiratory:  Breath sounds are equal, Symmetrical chest wall expansion.         Respirations: Are within normal limits.         Pattern: Regular.         Breath sounds: Bilateral, Within normal limits.    Cardiovascular:  Normal rate, Regular rhythm, No murmur, Normal peripheral perfusion, No edema.    Gastrointestinal:  Soft, Non-tender, Non-distended, Normal bowel sounds, No organomegaly.    Genitourinary:  No costovertebral angle tenderness.    Musculoskeletal:  mild tenderness anterior r ankle.    Integumentary:  Warm, Dry, No rash.       Health Maintenance      Recommendations     Pending (in the next year)        Due            Abdominal Aortic Aneurysm Screen (if hx of smoking) due  07/21/20  One-time only           Hepatitis C Screen 5125-9827 (Male) due  07/21/20  One-time only           Lung Cancer Screen (Male) due  07/21/20  and every 1  year(s)        Near Due            Influenza Vaccine near due  08/31/20  and every 1  year(s)        Due In Future            Lipid Disorders Screen (Male) not due until  07/07/21  and every 1   year(s)           Type 2 Diabetes Mellitus Screen (Male) not due until  07/07/21  and every 1  year(s)     Satisfied (in the past 1 year)        Satisfied            Alcohol Misuse Screen (Male) on  07/21/20.           Aspirin Therapy for Prevention of CVD (Male) on  07/21/20.           Body Mass Index Check (Male) on  07/21/20.           Body Mass Index Check (Male) on  12/18/19.           Colorectal Cancer Screen (Colonoscopy) (Male) on  01/13/20.           Colorectal Cancer Screen (Occult Blood) (Male) on  01/13/20.           Colorectal Cancer Screen (Sigmoidoscopy) (Male) on  01/13/20.           Depression Screen (Male) on  07/21/20.           Depression Screen (Male) on  07/21/20.           Depression Screen (Male) on  07/21/20.           Depression Screen (Male) on  07/21/20.           Fall Risk Screen (Male) on  07/21/20.           Fall Risk Screen (Male) on  07/21/20.           High Blood Pressure Screen (Male) on  07/21/20.           High Blood Pressure Screen (Male) on  12/18/19.           High Blood Pressure Screen (Male) on  12/18/19.           Lipid Disorders Screen (Male) on  07/07/20.           Lipid Disorders Screen (Male) on  07/07/20.           Lipid Disorders Screen (Male) on  07/07/20.           Lipid Disorders Screen (Male) on  07/07/20.           Obesity Screen and Counseling (Male) on  07/21/20.           Obesity Screen and Counseling (Male) on  12/18/19.           Pneumococcal Vaccine on  07/21/20.           Tobacco Use Screen (Male) on  07/21/20.           Tobacco Use Screen (Male) on  12/18/19.           Type 2 Diabetes Mellitus Screen (Male) on  07/07/20.        Canceled            Colorectal Cancer Screen (Sigmoidoscopy) (Male) on  07/21/20.          Review / Management   Results review      Impression and Plan   Diagnosis     Hypertension (DWW84-ZF I10).     Dyslipidemia (QQZ16-BI E78.5).     Blood Glucose Elevated (KFJ44-HL R73.09).     Course:  Progressing as expected.    Plan:   Monitor blood pressure, Weight monitoring, fu 6mo,  BMI,Weight loss,exercise,diet discussed  .    Patient Instructions:       Counseled: Patient, Regarding diagnosis, Regarding treatment, Regarding medications, Diet.

## 2022-02-15 NOTE — TELEPHONE ENCOUNTER
---------------------  From: Jessi King CMA (eRx Pool ([id number]_[facility]))   To: [employer] Message Pool ([id number]_[place] [facility] [place]);     Sent: 5/11/2021 3:28:30 PM CDT  Subject: FW: Medication Management   Due Date/Time: 5/12/2021 9:48:00 AM CDT     Med Refill      Date of last office visit and reason:  2/18 HTN  RTC order in chart:  8/2021    LRF 8/19/20 #30 with 1 refill            ------------------------------------------  From: [facility]  To: Samuel Zacarias MD  Sent: May 11, 2021 9:48:18 AM CDT  Subject: Medication Management  Due: April 27, 2021 12:41:41 AM CDT     ** On Hold Pending Signature **     Drug: cyclobenzaprine (cyclobenzaprine 10 mg oral tablet), TAKE ONE TABLET BY MOUTH THREE TIMES A DAY  Quantity: 30 unknown unit  Days Supply: 10  Refills: 0  Substitutions Allowed  Notes from Pharmacy:     Dispensed Drug: cyclobenzaprine (cyclobenzaprine 10 mg oral tablet), TAKE ONE TABLET BY MOUTH THREE TIMES A DAY  Quantity: 30 unknown unit  Days Supply: 10  Refills: 1  Substitutions Allowed  Notes from Pharmacy:  ---------------------------------------------------------------  From: Julia Palencia ([employer] Message Pool ([id number]_[facility]))   To: Samuel Zacarias MD;     Sent: 5/11/2021 4:15:44 PM CDT  Subject: FW: Medication Management   Due Date/Time: 5/12/2021 9:48:00 AM CDT---------------------  From: Samuel Zacarias MD   To: [facility]    Sent: 5/11/2021 4:22:56 PM CDT  Subject: FW: Medication Management     ** Submitted: **  Complete:cyclobenzaprine (cyclobenzaprine 10 mg oral tablet)   Signed by Samuel Zacarias MD  5/11/2021 9:22:00 PM [facility]    ** Approved with modifications: **  cyclobenzaprine (CYCLOBENZAPRINE HCL 10MG TABS)  TAKE ONE TABLET BY MOUTH THREE TIMES A DAY  Qty:  30 unknown unit        Days Supply:  10        Refills:  1          Substitutions Allowed     Route To Pharmacy - [facility]

## 2022-02-15 NOTE — CARE COORDINATION
Pt appears on TFS chronic disease panel as out of parameters for HTN  RTC CSS BP 3 wks, RTC HTN/LAB 10/2018

## 2022-02-15 NOTE — TELEPHONE ENCOUNTER
Entered by Thuy Meza CMA on April 08, 2019 2:28:12 PM CDT  Date of last office visit and reason:  2/6/19 Preop      Date of last Med Check / Px:   10/31/18 HTN  Date of last labs pertaining to med:  2/6/19 BMP     RTC order in chart:  Due back for HTN in 3 weeks. One month protocol given today    For Protocol refill, has patient been contacted:  Letter        ------------------------------------------  From: [facility]  To: Samuel Zacarias MD  Sent: April 8, 2019 7:22:53 AM CDT  Subject: Medication Management  Due: April 9, 2019 7:22:53 AM CDT    ** On Hold Pending Signature **  Drug: amLODIPine (Norvasc 10 mg oral tablet)  TAKE ONE TABLET BY MOUTH EVERY DAY  Quantity: 30 unknown unit  Days Supply: 30        Refills: 5  Substitutions Allowed  Notes from Pharmacy:     Dispensed Drug: amLODIPine (amLODIPine 10 mg oral tablet)  TAKE ONE TABLET BY MOUTH EVERY DAY  Quantity: 30 unknown unit  Days Supply: 30        Refills: 5  Substitutions Allowed  Notes from Pharmacy:   ---------------------------------------------------------------  From: Thuy Meza CMA   To: [facility]    Sent: 4/8/2019 2:28:45 PM CDT  Subject: Medication Management     ** Submitted: **  Order:amLODIPine (amLODIPine 10 mg oral tablet)  1 tab(s)  Oral  daily  Qty:  30 tab(s)        Refills:  0          Substitutions Allowed     Route To Pharmacy - [facility]    Signed by Thuy Meza CMA  4/8/2019 2:28:00 PM    ** Submitted: **  Complete:amLODIPine (amLODIPine 10 mg oral tablet)   Signed by Thuy Meza CMA  4/8/2019 2:28:00 PM    ** Not Approved:  **  amLODIPine (AMLODIPINE BESYLATE 10MG TABS)  TAKE ONE TABLET BY MOUTH EVERY DAY  Qty:  30 unknown unit        Days Supply:  30        Refills:  5          Substitutions Allowed     Route To Pharmacy - [facility] [place]   Signed by Thuy Meza CMA

## 2022-02-15 NOTE — LETTER
(Inserted Image. Unable to display)            August 24, 2021        [place] [address]        Dear ALEC,    Thank you for selecting [facility]  for your healthcare needs.  Below you will find the results of the recent tests done at our clinic.     All labs acceptable.      Result Name Current Result Previous Result Reference Range   Potassium Level (mmol/L)  3.9 8/20/2021  3.8 7/7/2020 3.5 - 5.3   Glucose Level (mg/dL) ((H)) 115 8/20/2021 ((H)) 112 7/7/2020 65 - 99   Creatinine Level (mg/dL)  0.95 8/20/2021  1.01 7/7/2020 0.70 - 1.25   Cholesterol (mg/dL)  119 8/20/2021  131 7/7/2020  - <200   HDL (mg/dL)  50 8/20/2021  62 7/7/2020 > OR = 40 -    LDL  51 8/20/2021  49 7/7/2020    Triglyceride (mg/dL)  94 8/20/2021  110 7/7/2020  - <150       Please contact me or my assistant at [phone] if you have any questions.     Sincerely,        Samuel Zacarias MD        What do your labs mean?  Below is a glossary of commonly ordered labs:  LDL   Bad Cholesterol   HDL   Good Cholesterol  AST/ALT   Liver Function   Cr/Creatinine   Kidney Function  Microalbumin   Kidney Function  BUN   Kidney Function  PSA   Prostate    TSH   Thyroid Hormone  HgbA1c   Diabetes Test   Hgb (Hemoglobin)   Red Blood Cells

## 2022-02-15 NOTE — NURSING NOTE
Comprehensive Intake Entered On:  6/5/2019 1:01 PM CDT    Performed On:  6/5/2019 12:59 PM CDT by Julia Palencia               Summary   Chief Complaint :   HTN check up   Weight Measured :   253.6 lb(Converted to: 253 lb 10 oz, 115.03 kg)    Height Measured :   73 in(Converted to: 6 ft 1 in, 185.42 cm)    Body Mass Index :   33.45 kg/m2 (HI)    Body Surface Area :   2.43 m2   Systolic Blood Pressure :   142 mmHg (HI)    Diastolic Blood Pressure :   75 mmHg   Mean Arterial Pressure :   97 mmHg   Peripheral Pulse Rate :   74 bpm   BP Method :   Electronic   HR Method :   Electronic   Temperature Tympanic :   98.3 DegF(Converted to: 36.8 DegC)    Julia Palencia - 6/5/2019 12:59 PM CDT   Health Status   Allergies Verified? :   Yes   Medication History Verified? :   Yes   Pre-Visit Planning Status :   Completed   Tobacco Use? :   Former smoker   Julia Palencia - 6/5/2019 12:59 PM CDT

## 2022-02-15 NOTE — NURSING NOTE
Vital Signs Entered On:  12/18/2019 3:18 PM CST    Performed On:  12/18/2019 3:17 PM CST by Julia Palencia               Vital Signs   Systolic Blood Pressure :   152 mmHg (HI)    Diastolic Blood Pressure :   84 mmHg (HI)    Mean Arterial Pressure :   107 mmHg   Peripheral Pulse Rate :   70 bpm   Julia Palencia - 12/18/2019 3:17 PM CST

## 2022-02-15 NOTE — LETTER
(Inserted Image. Unable to display)   June 06, 2019      ALEC VEGA       [place], [place] [id number]        Dear ALEC,    Thank you for selecting [facility] for your healthcare needs.  Below you will find the results of the recent tests done at our clinic.     All labs acceptable.      Result Name Current Result Previous Result Reference Range   Hgb A1c  5.6 6/5/2019  5.4 4/9/2018  - <5.7   Cholesterol (mg/dL)  133 6/5/2019  138 4/9/2018  - <200   HDL (mg/dL)  62 6/5/2019  59 4/9/2018 >40 -    LDL  53 6/5/2019  58 4/9/2018    Triglyceride (mg/dL)  99 6/5/2019  129 4/9/2018  - <150       Please contact me or my assistant at [phone] if you have any questions.     Sincerely,        Samuel Zacarias MD        What do your labs mean?  Below is a glossary of commonly ordered labs:  LDL   Bad Cholesterol   HDL   Good Cholesterol  AST/ALT   Liver Function   Cr/Creatinine   Kidney Function  Microalbumin   Kidney Function  BUN   Kidney Function  PSA   Prostate    TSH   Thyroid Hormone  HgbA1c   Diabetes Test   Hgb (Hemoglobin)   Red Blood Cells

## 2022-02-15 NOTE — PROGRESS NOTES
Patient:   ALEC VEGA            MRN: [id number]            FIN: [id number]               Age:   66 years     Sex:  Male     :  1955   Associated Diagnoses:   Olecranon bursitis, right elbow   Author:   Nikita Major MD      Visit Information      Date of Service: 2021 08:31 am  Performing Location: [facility]  Encounter#: [id number]      Primary Care Provider (PCP):  Samuel Zacarias MD    NPI# [id number]      Referring Provider:  Nikita Major MD    NPI# [id number]      Chief Complaint   2021 8:43 AM CDT     f/u right elbow olecranon busitis - seen by RONEN Schwartz am, wrapping up until yest - Lana noticed bruising down arm, denies much pain, swelling has decreased              Additional Information:No additional information recorded during visit.   Chief complaint and symptoms as noted above and confirmed with patient.  Recent lab and diagnostic studies reviewed with patient      History of Present Illness   2021: Armand returns for reevaluation of his right elbow.  Seen in urgent care clinic this past weekend due to presumed olecranon bursitis.  Describes a recent insect bite over that area and had regional over the elbow.  Had been advised to apply Ace wrap to the area to help reduce localized swelling.  After the Ace wrap he notices significant bruising and ecchymosis the inside aspect of his forearm and elbow.  Area is not painful he was just surprised by the degree of bruising.  He did not feel like it helped with his more regional degree of swelling olecranon bursa.  That area feels about the same.  No significant worsening no redness no pain.         Review of Systems   Constitutional:  No fever, No chills.    Eye:  Negative except as documented in history of present illness.    Ear/Nose/Mouth/Throat:  Negative except as documented in history of present illness.    Respiratory:  No shortness of breath.    Cardiovascular:  No chest pain, No palpitations, No peripheral edema.     Gastrointestinal:  No nausea.    Genitourinary   Hematology/Lymphatics:  Bruising tendency.    Endocrine:  No excessive thirst, No polyuria.    Immunologic:  No recurrent fevers.    Musculoskeletal:  No joint pain, No muscle pain, No trauma.    Neurologic:  Alert and oriented X4.       Health Status   Allergies:    Allergic Reactions (Selected)  Severity Not Documented  Latex (Rash)  No Known Medication Allergies   Medications:  (Selected)   Prescriptions  Prescribed  amLODIPine 10 mg oral tablet: = 1 tab(s), Oral, daily, # 90 tab(s), 1 Refill(s), Type: Maintenance, Pharmacy: [facility], 1 tab(s) Oral daily, 73, in, 02/18/21 9:58:00 CST, Height Measured, 261.1, lb, 02/18/21 9:58:00 CST, Weight Measured  aspirin 81 mg oral tablet: = 1 tab(s) ( 81 mg ), po, daily, # 30 tab(s), 11 Refill(s), Type: Maintenance, OTC (Rx)  atorvastatin 20 mg oral tablet: = 1 tab(s), Oral, daily, # 90 tab(s), 1 Refill(s), Type: Maintenance, Pharmacy: [facility], 1 tab(s) Oral daily, 73, in, 02/18/21 9:58:00 CST, Height Measured, 261.1, lb, 02/18/21 9:58:00 CST, Weight Measured  cyclobenzaprine 10 mg oral tablet: = 1 tab(s), Oral, tid, # 30 unknown unit, 1 Refill(s), Type: Acute, Pharmacy: [facility], TAKE ONE TABLET BY MOUTH THREE TIMES A DAY, 73, in, 02/18/21 10:33:00 CST, Height Measured, 261.1, lb, 02/18/21 9:58:00 CST, Weight Carlos...  hydroCHLOROthiazide 12.5 mg oral capsule: = 1 cap(s), Oral, daily, # 90 cap(s), 1 Refill(s), Type: Maintenance, Pharmacy: [facility], 1 cap(s) Oral daily, 73, in, 02/18/21 9:58:00 CST, Height Measured, 261.1, lb, 02/18/21 9:58:00 CST, Weight Measured  losartan 100 mg oral tablet: = 1 tab(s), Oral, daily, # 90 tab(s), 1 Refill(s), Type: Maintenance, Pharmacy: [facility], 1 tab(s) Oral daily, 73, in, 02/18/21 9:58:00 CST, Height Measured, 261.1, lb, 02/18/21 9:58:00 CST, Weight  Measured,    Medications          *denotes recorded medication          amLODIPine 10 mg oral tablet: 1 tab(s), Oral, daily, 90 tab(s), 1 Refill(s).          aspirin 81 mg oral tablet: 81 mg, 1 tab(s), po, daily, 30 tab(s), 11 Refill(s).          atorvastatin 20 mg oral tablet: 1 tab(s), Oral, daily, 90 tab(s), 1 Refill(s).          cyclobenzaprine 10 mg oral tablet: 1 tab(s), Oral, tid, 30 unknown unit, 1 Refill(s).          hydroCHLOROthiazide 12.5 mg oral capsule: 1 cap(s), Oral, daily, 90 cap(s), 1 Refill(s).          losartan 100 mg oral tablet: 1 tab(s), Oral, daily, 90 tab(s), 1 Refill(s).       Problem list:    All Problems  Chronic tension headaches / SNOMED CT 897942600 / Confirmed  Dyslipidemia / SNOMED CT 4910129738 / Confirmed  FH: stroke / SNOMED CT 896269222 / Confirmed  Hypertension / SNOMED CT 7829466517 / Confirmed  Hypertriglyceridemia / SNOMED CT 009216667 / Confirmed  Obese / SNOMED CT 8391896138 / Probable  Resolved: CT Cardiac Calcium Score  Canceled: Biceps tendonitis / SNOMED CT 485353228      Histories   Past Medical History:    Active  Hypertriglyceridemia (139465596): Onset on 6/4/2008 at 53 years.  Obese (8059142529)  FH: stroke (290036789)  Dyslipidemia (2247326149)  Chronic tension headaches (904859541)  Resolved  CT Cardiac Calcium Score:  Resolved.  Comments:  8/20/2014 CDT 1:07 PM CDT - Julia Palencia  Date of test 8/7/14  Score: 141   Family History:       Procedure history:    Screening for malignant neoplasm of colon (0352193963) on 1/13/2020 at 64 Years.  Comments:  1/17/2020 8:47 AM CST - Rula Garcia - Negative  Prosthetic hemiarthroplasty of joint (1493348246) on 4/15/2014 at 58 Years.  Comments:  10/8/2015 6:35 PM CDT - Chema Delacruz PA-C  left medial knee  Flexible sigmoidoscopy (13449538) on 5/20/2011 at 56 Years.  Flexible sigmoidoscopy (65806657) on 2/9/2006 at 50 Years.  Arthroscopy of shoulder (348272621) on 12/29/1998 at 43  Years.  Comments:  4/20/2011 10:52 AM SAUNDRA - Ez Geovanna  Right.  Bunionectomy (77911162).  Breast surgery (4019369495).  Comments:  4/20/2011 10:47 AM SAUNDRA Hui Geovanna  As a child.   Social History:        Electronic Cigarette/Vaping Assessment            Electronic Cigarette Use: Never.      Alcohol Assessment            Current, socially      Tobacco Assessment            Former smoker, quit more than 30 days ago      Substance Abuse Assessment            Never      Employment and Education Assessment            Retired      Home and Environment Assessment            Marital status: .  Spouse/Partner name: Mary.  Risks in environment: Does not wear helmet.      Nutrition and Health Assessment            Type of diet: Regular.      Exercise and Physical Activity Assessment            Exercise type: Walking.      Sexual Assessment            Sexually active: Yes.  Sexual orientation: Straight or heterosexual.  Contraceptive Use Details: None.        Physical Examination   vital signs stable, as noted above   Vital Signs   6/9/2021 8:47 AM CDT Systolic Blood Pressure 134 mmHg  HI    Diastolic Blood Pressure 75 mmHg    Mean Arterial Pressure 95 mmHg   6/9/2021 8:43 AM CDT Temperature Tympanic 98.2 DegF    Peripheral Pulse Rate 67 bpm    Systolic Blood Pressure 146 mmHg  HI    Diastolic Blood Pressure 75 mmHg    Mean Arterial Pressure 99 mmHg    Oxygen Saturation 95 %      Measurements from flowsheet : Measurements   6/9/2021 8:43 AM CDT Height Measured - Standard 73 in    Weight Measured - Standard 264.3 lb    BSA 2.48 m2    Body Mass Index 34.87 kg/m2  HI      General:  Alert and oriented, No acute distress.    Neck:  No carotid bruit.    Respiratory:  Respirations are non-labored.    Cardiovascular:  Normal rate.    Musculoskeletal:  Normal range of motion, Normal strength, No tenderness, Prominent focal swelling of right olecranon bursa without erythema or tenderness..  No fluctuance.  Visible  purpuric changes centered along medial epicondyle and tracking down forearm toward wrist.  No tenderness.    Neurologic:  Alert, Oriented, Normal motor function, No focal deficits.    Cognition and Speech:  Oriented, Speech clear and coherent.    Psychiatric:  Appropriate mood & affect.       Review / Management   Results review      Impression and Plan   Diagnosis     Olecranon bursitis, right elbow (WKS42-AB M70.21).       .) Acute right olecranon bursitis  - seen in urgent care on 6/6 with same presentation  - resultant ecchymotic changes - likely related to ACE wrap   - advised to stop any further ACE wrapping   - recommending exploring elbow sleeve options   - general reassurance - anticipatory guidance about bruising - no action needed  - no current clinical concerns of infection

## 2022-02-15 NOTE — LETTER
(Inserted Image. Unable to display)     December 06, 2019      ALEC VEGA   [place], [place] [id number]          Dear ALEC,      Thank you for selecting [facility] (previously [facility]) for your healthcare needs.      Our records indicate you are due for the following services:     Hypertension check ~ please remember to bring your at-home blood pressure readings with you to your appointment.       To schedule an appointment or if you have further questions, please contact your primary clinic:   [facility]       [phone]   Pending sale to [facility]       [phone]              [facility]     [phone]      Powered by [employer]    Sincerely,    Samuel Zacarias MD

## 2022-02-15 NOTE — PROGRESS NOTES
Patient:   ALEC VEGA            MRN: [id number]            FIN: [id number]               Age:   62 years     Sex:  Male     :  1955   Associated Diagnoses:   Acute bilateral low back pain; Cervical pain (neck)   Author:   Samuel Zacarias MD      Visit Information      Date of Service: 2017 11:18 am  Performing Location: [facility]  Encounter#: [id number]      Chief Complaint   2017 11:19 AM CDT   Patient here c/o pain/tingling in right side of neck and down into shoulder x1 month. c/o hip pain on both sides when standing x1 week.        History of Present Illness   chief complaint and symptoms as noted above confirmed with patient   no in jury      Review of Systems   Constitutional:  Negative except as documented in history of present illness.    Respiratory:  Negative.    Cardiovascular:  Negative.    Gastrointestinal:  Negative.    Genitourinary:  Negative.    Integumentary:  Negative.    Neurologic:  Negative except as documented in history of present illness.       Health Status   Allergies:    Allergic Reactions (Selected)  Severity Not Documented  Latex (Rash)   Medications:  (Selected)   Prescriptions  Prescribed  Cozaar 25 mg oral tablet: 1 tab(s) ( 25 mg ), PO, Daily, # 90 tab(s), 1 Refill(s), Type: Maintenance, Pharmacy: [facility], 1 tab(s) po daily  Lipitor 20 mg oral tablet: 1 tab(s) ( 20 mg ), PO, Daily, # 90 tab(s), 1 Refill(s), Type: Maintenance, Pharmacy: [facility], 1 tab(s) po daily  amLODIPine 10 mg oral tablet: 1 tab(s) ( 10 mg ), po, daily, # 90 tab(s), 1 Refill(s), Type: Maintenance, Pharmacy: [facility], 1 tab(s) po daily  hydroCHLOROthiazide 12.5 mg oral capsule: 1 cap(s) ( 12.5 mg ), po, daily, # 90 cap(s), 1 Refill(s), Type: Maintenance, Pharmacy: [facility], 1 cap(s) po daily  Documented Medications  Documented  aspirin 81 mg oral tablet: 1 tab(s) ( 81 mg ), po, daily, 0  Refill(s), Type: Maintenance   Problem list:    All Problems (Selected)  Hypertension / SNOMED CT 4148358572 / Confirmed  Obese / ICD-9-.00 / Probable  Biceps Tendonitis / ICD-9-.12 / Confirmed  FH: stroke / SNOMED CT 808162467 / Confirmed  Dyslipidemia / ICD-9-.4 / Confirmed  Hypertriglyceridemia / ICD-9-.1 / Confirmed  Chronic Tension Headaches / ICD-9-.81 / Confirmed      Histories   Past Medical History:    Active  Biceps Tendonitis (726.12): Onset on 2/27/2010 at 54 years.  Comments:  4/20/2011 CDT 10:35 AM CDT - Geovanna Hui  Acute.  Hypertriglyceridemia (272.1): Onset on 6/4/2008 at 53 years.  FH: stroke (995931762)  Dyslipidemia (272.4)  Chronic Tension Headaches (307.81)  Resolved  CT Cardiac Calcium Score:  Resolved.  Comments:  8/20/2014 CDT 1:07 PM CDT - Julia Palencia  Date of test 8/7/14  Score: 141   Family History:    No family history items have been selected or recorded.   Procedure history:    Prosthetic hemiarthroplasty of joint (SNOMED CT 8189946345) on 4/15/2014 at 58 Years.  Comments:  10/8/2015 6:35 PM - Chema Delacruz PA-C.  left medial knee  Flexible sigmoidoscopy (SNOMED CT 69617174) on 5/20/2011 at 56 Years.  Flexible sigmoidoscopy (SNOMED CT 92974513) on 2/9/2006 at 50 Years.  Arthroscopy of shoulder (SNOMED CT 576613028) on 12/29/1998 at 43 Years.  Comments:  4/20/2011 10:52 AM - Geovanna Hui  Right.  Bunionectomy (SNOMED CT 66972632).  Breast surgery (SNOMED CT 4963447868).  Comments:  4/20/2011 10:47 AM - Geovanna Hui  As a child.   Social History:        Alcohol Assessment: Current                     Comments:                      04/20/2011 - Geovanna Hui                     Social.      Tobacco Assessment: Denies Tobacco Use      Other Assessment            Marital status.                     Comments:                      04/20/2011 - Geovanna Hui                     .        Physical Examination   Vital Signs   6/30/2017 11:19 AM  CDT Temperature Tympanic 98.0 DegF    Peripheral Pulse Rate 67 bpm    HR Method Electronic    Systolic Blood Pressure 132 mmHg    Diastolic Blood Pressure 79 mmHg    Mean Arterial Pressure 97 mmHg    BP Site Right arm    BP Method Electronic      Measurements from flowsheet : Measurements   6/30/2017 11:19 AM CDT Height Measured - Standard 73 in    Weight Measured - Standard 248.6 lb    BSA 2.41 m2    Body Mass Index 32.8 kg/m2      General:  Alert and oriented, No acute distress.    Musculoskeletal:          Spine/torso exam: Cervical ( Right, Tenderness ), Lumbar ( Bilateral, low back buttocks pain with movement ).         Lower extremity exam: Lower extremity exam is within normal limits.    Neurologic:  Alert, Oriented, Normal sensory, Normal motor function, Normal deep tendon reflexes.       Impression and Plan   Diagnosis     Acute bilateral low back pain (SII14-WP M54.5).     Cervical pain (neck) (NGN80-ZQ M54.2).     Course:  Progressing as expected.    Plan:       Refer to: Physical therapy.    Patient Instructions:       Counseled: Patient, Regarding diagnosis, Regarding medications, Activity.

## 2022-02-15 NOTE — LETTER
(Inserted Image. Unable to display)     January 25, 2021      ALEC VEGA   [place], [place] [id number]          Dear ALEC,      Thank you for selecting [facility] ([facility] for [place] [facility] needs.    Our records indicate you are due for the following services:     Hypertension check ~ please remember to bring your at-home blood pressure readings with you to your appointment.     (FYI   Regarding office visits: In some instances, a video visit or telephone visit may be offered as an option.)      To schedule an appointment or if you have further questions, please contact your clinic at [phone].      Powered by [employer]    Sincerely,    Samuel Zacarias MD

## 2022-02-15 NOTE — NURSING NOTE
Comprehensive Intake Entered On:  2/18/2021 10:00 AM CST    Performed On:  2/18/2021 9:58 AM CST by Julia Palencia               Summary   Chief Complaint :   Chronic Disease Visit   Weight Measured :   261.1 lb(Converted to: 261 lb 2 oz, 118.433 kg)    Height Measured :   73 in(Converted to: 6 ft 1 in, 185.42 cm)    Body Mass Index :   34.44 kg/m2 (HI)    Body Surface Area :   2.47 m2   Systolic Blood Pressure :   146 mmHg (HI)    Diastolic Blood Pressure :   78 mmHg   Mean Arterial Pressure :   101 mmHg   Peripheral Pulse Rate :   62 bpm   BP Method :   Electronic   HR Method :   Electronic   Temperature Tympanic :   97.3 DegF(Converted to: 36.3 DegC)  (LOW)    Julia Palencia - 2/18/2021 9:58 AM CST   Health Status   Allergies Verified? :   Yes   Medication History Verified? :   Yes   Pre-Visit Planning Status :   Completed   Tobacco Use? :   Former smoker   Julia Palencia - 2/18/2021 9:58 AM CST   ID Risk Screen   Recent Travel History :   No recent travel   Family Member Travel History :   No recent travel   Other Exposure to Infectious Disease :   Unknown   COVID-19 Testing Status :   No positive COVID-19 test   Julia Palencia - 2/18/2021 9:58 AM CST   Social History   Social History   (As Of: 2/18/2021 10:00:20 AM CST)   Alcohol:        Current, socially   (Last Updated: 4/20/2018 9:05:15 AM CDT by Mackenzie Lomax)          Tobacco:        Former smoker, quit more than 30 days ago   (Last Updated: 2/18/2021 9:58:55 AM CST by Julia Palencia)          Electronic Cigarette/Vaping:        Electronic Cigarette Use: Never.   (Last Updated: 2/18/2021 9:58:59 AM CST by Julia Palencia)          Substance Abuse:        Never   (Last Updated: 4/20/2018 9:05:30 AM CDT by Mackenzie Lomax)          Employment/School:        Retired   (Last Updated: 4/20/2018 9:05:39 AM CDT by Mackenzie Lomax)          Home/Environment:        Marital status: .  Spouse/Partner name: Mary.  Risks in environment: Does not  wear helmet.   (Last Updated: 4/20/2018 9:05:51 AM CDT by Mackenzie Lomax)          Nutrition/Health:        Type of diet: Regular.   (Last Updated: 4/20/2018 9:06:01 AM CDT by Mackenzie Lomax)          Exercise:        Exercise type: Walking.   (Last Updated: 4/20/2018 9:06:05 AM CDT by Mackenzie Lomax)          Sexual:        Sexually active: Yes.  Sexual orientation: Straight or heterosexual.  Contraceptive Use Details: None.   (Last Updated: 4/20/2018 9:06:15 AM CDT by Mackenzie Lomax)

## 2022-02-15 NOTE — NURSING NOTE
Comprehensive Intake Entered On:  12/18/2019 2:58 PM CST    Performed On:  12/18/2019 2:56 PM CST by Julia Palencia               Summary   Chief Complaint :   HTN check up   Weight Measured :   253.2 lb(Converted to: 253 lb 3 oz, 114.85 kg)    Height Measured :   73 in(Converted to: 6 ft 1 in, 185.42 cm)    Body Mass Index :   33.4 kg/m2 (HI)    Body Surface Area :   2.43 m2   Systolic Blood Pressure :   156 mmHg (HI)    Diastolic Blood Pressure :   82 mmHg (HI)    Mean Arterial Pressure :   107 mmHg   Peripheral Pulse Rate :   68 bpm   BP Method :   Electronic   HR Method :   Electronic   Temperature Tympanic :   97.2 DegF(Converted to: 36.2 DegC)  (LOW)    Julia Palencia - 12/18/2019 2:56 PM CST   Health Status   Allergies Verified? :   Yes   Medication History Verified? :   Yes   Pre-Visit Planning Status :   Completed   Tobacco Use? :   Former smoker   Julia Palencia - 12/18/2019 2:56 PM CST   [facility] Hospital Medicine  Progress Note    Patient Name: Carolee Ortega  MRN: [id number]  Patient Class: IP- Inpatient   Admission Date: 12/17/2020  Length of Stay: 1 days  Attending Physician: Juanito Saucedo MD  Primary Care Provider: Genie Walter MD        Subjective:     Principal Problem:<principal problem not specified>        HPI:  60 yo female with PMH of Asthma and HTN presented with Fevers, cough and sob. She also has  associated with Diarrhea.  Patient was diagnosed with COVID on 12/11. She has elevated Inflammatory markers and CT chest with patchy infiltrates. Denies nausea, vomiting, Chest pain, abd pain.     Overview/Hospital Course:  60 yo severely obese female with Asthma and HTN presented with fevers, cough and sob, associated with Diarrhea. She is COVID Positive since 12/11. She has elevated Inflammatory markers and CT chest with patchy infiltrates. Admitted and started on Remdesivir and Dexa. She feels better, stronger. No fever or SOB, sitting up without any SOB, on RA- sats 95% RA. PO4 low at 2.5- getting oral Neutra phos. Eating drinking well, diarrhea improved.     Interval History: Admitted and started on Remdesivir and Dexa. She feels better, stronger. No fever or SOB, sitting up without any SOB, on RA- sats 95% RA. PO4 low at 2.5- getting oral Neutra phos. Eating drinking well, diarrhea improved.     Review of Systems   Constitutional: Positive for activity change and fatigue. Negative for fever.   HENT: Negative.    Eyes: Negative.    Respiratory: Negative for cough and shortness of breath.    Cardiovascular: Negative.    Gastrointestinal: Negative.  Negative for diarrhea, nausea and rectal pain.   Endocrine: Negative.    Genitourinary: Negative.    Musculoskeletal: Negative.    Allergic/Immunologic: Negative.    Neurological: Negative.    Hematological: Negative.    Psychiatric/Behavioral: Negative.      Objective:     Vital Signs (Most Recent):  Temp: 98.8 °F  (37.1 °C) (12/18/20 1653)  Pulse: 74 (12/18/20 1700)  Resp: 16 (12/18/20 1653)  BP: 124/75 (12/18/20 1653)  SpO2: 95 % (12/18/20 1653) Vital Signs (24h Range):  Temp:  [98.3 °F (36.8 °C)-99.8 °F (37.7 °C)] 98.8 °F (37.1 °C)  Pulse:  [59-85] 74  Resp:  [16-24] 16  SpO2:  [92 %-96 %] 95 %  BP: (109-161)/(64-85) 124/75     Weight: 101.6 kg (223 lb 15.8 oz)  Body mass index is 40.97 kg/m².    Intake/Output Summary (Last 24 hours) at 12/18/2020 1928  Last data filed at 12/18/2020 0600  Gross per 24 hour   Intake 780 ml   Output --   Net 780 ml      Physical Exam  Vitals signs and nursing note reviewed.   Constitutional:       Appearance: Normal appearance.   HENT:      Head: Normocephalic and atraumatic.      Nose: Nose normal.   Eyes:      Extraocular Movements: Extraocular movements intact.      Pupils: Pupils are equal, round, and reactive to light.   Neck:      Musculoskeletal: Normal range of motion.   Cardiovascular:      Rate and Rhythm: Normal rate and regular rhythm.      Pulses: Normal pulses.      Heart sounds: Normal heart sounds.   Pulmonary:      Effort: Pulmonary effort is normal.      Breath sounds: Normal breath sounds.   Abdominal:      General: Abdomen is flat.      Palpations: Abdomen is soft.   Musculoskeletal: Normal range of motion.   Skin:     General: Skin is warm.      Capillary Refill: Capillary refill takes less than 2 seconds.   Neurological:      General: No focal deficit present.      Mental Status: She is alert and oriented to person, place, and time.         Significant Labs:   ABGs:   Blood Culture:   Recent Labs   Lab 12/17/20  1906 12/17/20 1928   LABBLOO No Growth to date No Growth to date     BMP:   Recent Labs   Lab 12/18/20  0652   *      K 4.0      CO2 23   BUN 9   CREATININE 0.8   CALCIUM 8.5*   MG 2.5     CBC:   Recent Labs   Lab 12/17/20  1356 12/18/20  0652   WBC 3.75* 2.66*   HGB 13.8 12.8   HCT 42.6 40.4    183     CMP:   Recent Labs   Lab  12/17/20  1356 12/18/20  0652    136   K 3.7 4.0    103   CO2 26 23   * 129*   BUN 8 9   CREATININE 0.9 0.8   CALCIUM 8.5* 8.5*   PROT 7.7 7.4   ALBUMIN 3.5 3.1*   BILITOT 0.5 0.4   ALKPHOS 47* 43*   AST 44* 48*   ALT 36 43   ANIONGAP 9 10   EGFRNONAA >60 >60     Magnesium:   Recent Labs   Lab 12/18/20  0652   MG 2.5     POCT Glucose:   Troponin:   Recent Labs   Lab 12/17/20  1356   TROPONINI <0.006     All pertinent labs within the past 24 hours have been reviewed.    Significant Imaging: I have reviewed all pertinent imaging results/findings within the past 24 hours.      Assessment/Plan:      Pneumonia due to COVID-19 virus    Admit for COVID pneumonia  Airborne and contact precautions  Will start her on Decadron, remdesivir, azithromycin, ceftriaxone, vitamin-C, zinc sulfate  Check blood cultures sputum cultures  Keep oxygen sats present on 92%     On Remdesivir plus Dexa, fortunately no resp failure  Continue same    Hypophosphatemia  Mild, sec to diarrhea, poor oral intake    Replaced with Neutraphos, increase oral intake, diarrhea resolved      Asthma    Has occasional wheezing.  Will keep her on Albuterol Nebs around the clock    Essential hypertension    Continue amlodipine, metoprolol, spironolactone      VTE Risk Mitigation (From admission, onward)         Ordered     heparin (porcine) injection 5,000 Units  Every 8 hours      12/17/20 1816     IP VTE HIGH RISK PATIENT  Once      12/17/20 1816     Place sequential compression device  Until discontinued      12/17/20 1816                Discharge Planning   DEBBIE:      Code Status: Full Code   Is the patient medically ready for discharge?:     Reason for patient still in hospital (select all that apply): Patient trending condition, Laboratory test, Treatment and Imaging  Discharge Plan A: Home with family                  Juanito Saucedo MD  Department of Hospital Medicine   [facility]

## 2022-02-15 NOTE — NURSING NOTE
Vital Signs Entered On:  6/5/2019 1:04 PM CDT    Performed On:  6/5/2019 1:02 PM CDT by Julia Palencia               Vital Signs   Systolic Blood Pressure :   132 mmHg (HI)    Diastolic Blood Pressure :   72 mmHg   Mean Arterial Pressure :   92 mmHg   Peripheral Pulse Rate :   75 bpm   Julia Palencia - 6/5/2019 1:02 PM CDT

## 2022-02-15 NOTE — PROGRESS NOTES
Patient:   ALEC VEGA            MRN: [id number]            FIN: [id number]               Age:   61 years     Sex:  Male     :  1955   Associated Diagnoses:   None   Author:   Rich Renner MD      Visit Information      Date of Service: 03/15/2017 10:46 am  Performing Location: [facility]  Encounter#: [id number]      Primary Care Provider (PCP):  Samuel Zacarias MD# [id number]      Referring Provider:  No referring provider recorded for selected visit.      Chief Complaint   3/15/2017 10:52 AM CDT   Pt c/o sore throat and cough. No SOB.        History of Present Illness   CC as above and reviewed w  patient   Pt complains of URI symptoms for several days now      Review of Systems         No fever, shortness of breath, or eye problems      Health Status   Allergies:    Allergic Reactions (Selected)  Severity Not Documented  Latex (Rash)   Medications:  (Selected)   Prescriptions  Prescribed  Atrovent 42 mcg/inh nasal spray: 2 spray(s), nasal, qid, PRN: as needed for cold symptoms, # 1 EA, 0 Refill(s), Type: Maintenance, Pharmacy: [facility], 2 spray(s) nasal qid,PRN:as needed for cold symptoms  Cozaar 25 mg oral tablet: 1 tab(s) ( 25 mg ), PO, Daily, # 90 tab(s), 1 Refill(s), Type: Maintenance, Pharmacy: [facility], 1 tab(s) po daily  Lipitor 20 mg oral tablet: 1 tab(s) ( 20 mg ), PO, Daily, # 90 tab(s), 1 Refill(s), Type: Maintenance, Pharmacy: [facility], 1 tab(s) po daily  Tessalon Perles 100 mg oral capsule: 1 cap(s) ( 100 mg ), PO, TID, PRN: as needed for cough, # 15 cap(s), 0 Refill(s), Type: Maintenance, Pharmacy: [facility], 1 cap(s) po tid,x7 day(s),PRN:as needed for cough  amLODIPine 10 mg oral tablet: 1 tab(s) ( 10 mg ), po, daily, # 90 tab(s), 1 Refill(s), Type: Maintenance, Pharmacy: [facility], 1 tab(s) po daily  hydroCHLOROthiazide 12.5 mg oral capsule: 1  cap(s) ( 12.5 mg ), po, daily, # 90 cap(s), 1 Refill(s), Type: Maintenance, Pharmacy: [facility], 1 cap(s) po daily  Documented Medications  Documented  aspirin 81 mg oral tablet: 1 tab(s) ( 81 mg ), po, daily, 0 Refill(s), Type: Maintenance   Problem list:    All Problems (Selected)  Biceps Tendonitis / ICD-9-.12 / Confirmed  Chronic Tension Headaches / ICD-9-.81 / Confirmed  Dyslipidemia / ICD-9-.4 / Confirmed  FH: stroke / SNOMED CT 519097959 / Confirmed  Hypertension / SNOMED CT 8698735945 / Confirmed  Hypertriglyceridemia / ICD-9-.1 / Confirmed  Obese / ICD-9-.00 / Probable      Histories   Past Medical History:    Active  Biceps Tendonitis (726.12): Onset on 2/27/2010 at 54 years.  Comments:  4/20/2011 CDT 10:35 AM CDT - Geovanna Hui.  Hypertriglyceridemia (272.1): Onset on 6/4/2008 at 53 years.  FH: stroke (040471435)  Dyslipidemia (272.4)  Chronic Tension Headaches (307.81)  Resolved  CT Cardiac Calcium Score:  Resolved.  Comments:  8/20/2014 CDT 1:07 PM CDT - Julia Mcdaniels  Date of test 8/7/14  Score: 141   Family History:    No family history items have been selected or recorded.   Procedure history:    Prosthetic hemiarthroplasty of joint (4790374367) on 4/15/2014 at 58 Years.  Comments:  10/8/2015 6:35 PM - Chema Delacruz PA-C.  left medial knee  Flexible sigmoidoscopy (86454450) on 5/20/2011 at 56 Years.  Flexible sigmoidoscopy (15974352) on 2/9/2006 at 50 Years.  Arthroscopy of shoulder (742984422) on 12/29/1998 at 43 Years.  Comments:  4/20/2011 10:52 AM - Geovanna Hui  Right.  Bunionectomy (51235609).  Breast surgery (7459598786).  Comments:  4/20/2011 10:47 AM - Geovanna Hui  As a child.   Social History:        Alcohol Assessment: Current                     Comments:                      04/20/2011 - Geovanna Hui.      Tobacco Assessment: Denies Tobacco Use      Other Assessment            Marital  status.                     Comments:                      04/20/2011 - Geovanna Hui                     .        Physical Examination   Vital Signs   3/15/2017 10:52 AM CDT Temperature Tympanic 99 DegF    Peripheral Pulse Rate 76 bpm    Systolic Blood Pressure 132 mmHg    Diastolic Blood Pressure 78 mmHg    Mean Arterial Pressure 96 mmHg    Oxygen Saturation 94 %      Measurements from flowsheet : Measurements   3/15/2017 10:52 AM CDT Height Measured - Standard 73 in    Weight Measured - Standard 255 lb    BSA 2.44 m2    Body Mass Index 33.64 kg/m2      Gen - appears well  Mild maxillary snisu tenderness  TMs normal  Neck - supple w/o adenopathy  CV: RRR w/o MRG. Normal S1 and S2   Resp: Clear to auscultation b/l. Normal breath sounds without wheezes or crackles. No increased work of breathing.          Review / Management   Results review:  Lab results   3/7/2017 3:48 PM CST Sodium Level 139 mmol/L    Potassium Level 3.7 mmol/L    Chloride Level 104 mmol/L    CO2 Level 26 mmol/L    Glucose Level 105 mg/dL  HI    BUN 17 mg/dL    Creatinine 1.02 mg/dL    BUN/Creat Ratio NOT APPLICABLE    eGFR 79 mL/min/1.73m2    eGFR African American 92 mL/min/1.73m2    Calcium Level 9.3 mg/dL    Hgb A1c 5.8  HI    Cholesterol 133 mg/dL    Non-HDL 76    HDL 57 mg/dL    Chol/HDL Ratio 2.3    LDL 55    Triglyceride 103 mg/dL   .       Impression and Plan   URI/sinusitis  - saline nasal sprays  - atrovent nasal spray prn  - tessalon perles prn  - f/u if fever, worsening, or persistent.

## 2022-02-15 NOTE — PROGRESS NOTES
Patient:   ALEC VEGA            MRN: [id number]            FIN: [id number]               Age:   65 years     Sex:  Male     :  1955   Associated Diagnoses:   Hypertension; Dyslipidemia; Blood Glucose Elevated; Back skin lesion   Author:   Samuel Zacarias MD      Visit Information      Date of Service: 2021 09:57 am  Performing Location: [facility]  Encounter#: [id number]      Primary Care Provider (PCP):  Samuel Zacarias MD    NPI# [id number]      Referring Provider:  Samuel Zacarias MD# [id number]      Chief Complaint   2021 9:58 AM CST    Chronic Disease Visit        History of Present Illness             The patient presents for follow-up evaluation of hypertension.  The quality of hypertension symptom(s) since the patient's last visit is described as being unchanged.  Exacerbating factors consist of noncompliance with diet.  Relieving factors consist of medication.  Associated symptoms consist of none.     chief complaint and symptoms as noted above confirmed with patient   meds as directed rare use of flexeril for back  skin lesion right back bothersome      Review of Systems   Constitutional:  Negative.    Eye:  Negative.    Ear/Nose/Mouth/Throat:  Negative.    Respiratory:  Negative.    Cardiovascular:  Negative.    Gastrointestinal:  Negative.    Genitourinary:  Negative.    Hematology/Lymphatics:  Negative.    Endocrine:  Negative.    Immunologic:  Negative.    Musculoskeletal:  Negative except as documented in history of present illness.    Integumentary:  Negative.    Neurologic:  Negative.       Health Status   Allergies:    Allergic Reactions (Selected)  Severity Not Documented  Latex (Rash)  No Known Medication Allergies   Medications:  (Selected)   Prescriptions  Prescribed  amLODIPine 10 mg oral tablet: = 1 tab(s), Oral, daily, # 90 tab(s), 1 Refill(s), Type: Maintenance, [facility], 1 tab(s) Oral daily, 73, in,  02/18/21 9:58:00 CST, Height Measured, 261.1, lb, 02/18/21 9:58:00 CST, Weight Measured  aspirin 81 mg oral tablet: = 1 tab(s) ( 81 mg ), po, daily, # 30 tab(s), 11 Refill(s), Type: Maintenance, OTC (Rx)  atorvastatin 20 mg oral tablet: = 1 tab(s), Oral, daily, # 90 tab(s), 1 Refill(s), Type: Maintenance, Pharmacy: [facility], 1 tab(s) Oral daily, 73, in, 02/18/21 9:58:00 CST, Height Measured, 261.1, lb, 02/18/21 9:58:00 CST, Weight Measured  cyclobenzaprine 10 mg oral tablet: = 1 tab(s), Oral, tid, # 30 unknown unit, 1 Refill(s), Type: Acute, Pharmacy: [facility], TAKE ONE TABLET BY MOUTH THREE TIMES A DAY, 73, in, 07/21/20 12:56:00 CDT, Height Measured, 259.8, lb, 07/21/20 12:56:00 CDT, Weight Steffany...  hydroCHLOROthiazide 12.5 mg oral capsule: = 1 cap(s), Oral, daily, # 90 cap(s), 1 Refill(s), Type: Maintenance, Pharmacy: [facility], 1 cap(s) Oral daily, 73, in, 02/18/21 9:58:00 CST, Height Measured, 261.1, lb, 02/18/21 9:58:00 CST, Weight Measured  losartan 100 mg oral tablet: = 1 tab(s), Oral, daily, # 90 tab(s), 1 Refill(s), Type: Maintenance, Pharmacy: [facility], 1 tab(s) Oral daily, 73, in, 02/18/21 9:58:00 CST, Height Measured, 261.1, lb, 02/18/21 9:58:00 CST, Weight Measured,    Medications          *denotes recorded medication          amLODIPine 10 mg oral tablet: 1 tab(s), Oral, daily, 90 tab(s), 1 Refill(s).          aspirin 81 mg oral tablet: 81 mg, 1 tab(s), po, daily, 30 tab(s), 11 Refill(s).          atorvastatin 20 mg oral tablet: 1 tab(s), Oral, daily, 90 tab(s), 1 Refill(s).          cyclobenzaprine 10 mg oral tablet: 1 tab(s), Oral, tid, 30 unknown unit, 1 Refill(s).          hydroCHLOROthiazide 12.5 mg oral capsule: 1 cap(s), Oral, daily, 90 cap(s), 1 Refill(s).          losartan 100 mg oral tablet: 1 tab(s), Oral, daily, 90 tab(s), 1 Refill(s).       Problem list:    All Problems  (Selected)  Biceps tendonitis / SNOMED CT 503297029 / Confirmed  Chronic tension headaches / SNOMED CT 148204302 / Confirmed  Dyslipidemia / SNOMED CT 6973032148 / Confirmed  FH: stroke / SNOMED CT 840420507 / Confirmed  Hypertension / SNOMED CT 2881422940 / Confirmed  Hypertriglyceridemia / SNOMED CT 765429095 / Confirmed  Obese / SNOMED CT 3422042731 / Probable      Histories   Past Medical History:    Active  Biceps tendonitis (344113858): Onset on 2/27/2010 at 54 years.  Comments:  4/20/2011 CDT 10:35 AM CDT - Geovanna Hui  Acute.  Hypertriglyceridemia (906928996): Onset on 6/4/2008 at 53 years.  Obese (2134856401)  FH: stroke (378952966)  Dyslipidemia (0872465342)  Chronic tension headaches (478579404)  Resolved  CT Cardiac Calcium Score:  Resolved.  Comments:  8/20/2014 CDT 1:07 PM CDT - Julia Palencia  Date of test 8/7/14  Score: 141   Family History:       Procedure history:    Screening for malignant neoplasm of colon (SNOMED CT 5439342013) on 1/13/2020 at 64 Years.  Comments:  1/17/2020 8:47 AM CST - Rula Garcia - Negative  Prosthetic hemiarthroplasty of joint (SNOMED CT 8487107212) on 4/15/2014 at 58 Years.  Comments:  10/8/2015 6:35 PM CDT - Chema Delacruz PA-C.  left medial knee  Flexible sigmoidoscopy (SNOMED CT 12784735) on 5/20/2011 at 56 Years.  Flexible sigmoidoscopy (SNOMED CT 80973153) on 2/9/2006 at 50 Years.  Arthroscopy of shoulder (SNOMED CT 973715581) on 12/29/1998 at 43 Years.  Comments:  4/20/2011 10:52 AM SAUNDRA - Geovanna Hui  Right.  Bunionectomy (SNOMED CT 13642261).  Breast surgery (SNOMED CT 5830566953).  Comments:  4/20/2011 10:47 AM SAUNDRA - Geovanna Hui  As a child.   Social History:        Electronic Cigarette/Vaping Assessment            Electronic Cigarette Use: Never.      Alcohol Assessment            Current, socially      Tobacco Assessment            Former smoker, quit more than 30 days ago      Substance Abuse Assessment            Never       Employment and Education Assessment            Retired      Home and Environment Assessment            Marital status: .  Spouse/Partner name: Mary.  Risks in environment: Does not wear helmet.      Nutrition and Health Assessment            Type of diet: Regular.      Exercise and Physical Activity Assessment            Exercise type: Walking.      Sexual Assessment            Sexually active: Yes.  Sexual orientation: Straight or heterosexual.  Contraceptive Use Details: None.        Physical Examination   Vital Signs   2/18/2021 10:33 AM CST Systolic Blood Pressure 128 mmHg    Diastolic Blood Pressure 76 mmHg    Mean Arterial Pressure 93 mmHg   2/18/2021 9:58 AM CST Temperature Tympanic 97.3 DegF  LOW    Peripheral Pulse Rate 62 bpm    HR Method Electronic    Systolic Blood Pressure 146 mmHg  HI    Diastolic Blood Pressure 78 mmHg    Mean Arterial Pressure 101 mmHg    BP Method Electronic      Measurements from flowsheet : Measurements   2/18/2021 9:58 AM CST Height Measured - Standard 73 in    Weight Measured - Standard 261.1 lb    BSA 2.47 m2    Body Mass Index 34.44 kg/m2  HI      General:  Alert and oriented, No acute distress.    Eye:  Normal conjunctiva.    HENT:  Normocephalic, Tympanic membranes are clear.    Neck:  Supple, Non-tender, No lymphadenopathy, No thyromegaly.    Respiratory:  Breath sounds are equal, Symmetrical chest wall expansion.         Respirations: Are within normal limits.         Pattern: Regular.         Breath sounds: Bilateral, Within normal limits.    Cardiovascular:  Normal rate, Regular rhythm, No murmur, Normal peripheral perfusion, No edema.    Gastrointestinal:  Soft, Non-tender, Non-distended, Normal bowel sounds, No organomegaly.    Genitourinary:  No costovertebral angle tenderness.    Musculoskeletal:  degenerative changes noted.    Integumentary:  Warm, Dry, No rash, 1 cm seb keratosis right lower back  frozen x3 with liquid nitrogen.       Health Maintenance       Recommendations     Pending (in the next year)        OverDue           Influenza Vaccine due  09/01/20  and every 1  year(s)        Due            Abdominal Aortic Aneurysm Screen (if hx of smoking) due  02/18/21  One-time only           Hepatitis C Screen [id number] (Male) due  02/18/21  One-time only           Lung Cancer Screen (Male) due  02/18/21  and every 1  year(s)        Due In Future            Lipid Disorders Screen (Male) not due until  07/07/21  and every 1  year(s)           Type 2 Diabetes Mellitus Screen (Male) not due until  07/07/21  and every 1  year(s)           Fall Risk Screen (Male) not due until  07/21/21  and every 1  year(s)           Depression Screen (Male) not due until  07/21/21  and every 1  year(s)     Satisfied (in the past 1 year)        Satisfied            Alcohol Misuse Screen on  07/21/20.           Alcohol Misuse Screen on  07/21/20.           Body Mass Index Check on  02/18/21.           Body Mass Index Check on  07/21/20.           Depression Screen (Male) on  07/21/20.           Depression Screen (Male) on  07/21/20.           Depression Screen (Male) on  07/21/20.           Depression Screen (Male) on  07/21/20.           Fall Risk Screen (Male) on  07/21/20.           Fall Risk Screen (Male) on  07/21/20.           High Blood Pressure Screen (Male) on  02/18/21.           High Blood Pressure Screen (Male) on  07/21/20.           Lipid Disorders Screen (Male) on  07/07/20.           Lipid Disorders Screen (Male) on  07/07/20.           Lipid Disorders Screen (Male) on  07/07/20.           Lipid Disorders Screen (Male) on  07/07/20.           Obesity Screen and Counseling (Male) on  02/18/21.           Obesity Screen and Counseling (Male) on  07/21/20.           Pneumococcal Vaccine on  07/21/20.           Tobacco Use Screen (Male) on  02/18/21.           Tobacco Use Screen (Male) on  07/21/20.           Type 2 Diabetes Mellitus Screen (Male) on  07/07/20.        Canceled             Colorectal Cancer Screen (Sigmoidoscopy) (Male) on  07/21/20.          Review / Management   Results review      Impression and Plan   Diagnosis     Hypertension (WGV98-MC I10).     Dyslipidemia (MJT63-DU E78.5).     Blood Glucose Elevated (VTK02-TI R73.09).     Back skin lesion (JIE62-PH L98.9).     Course:  Progressing as expected.    Plan:   1.  Hypertension under reasonable control on amlodipine losartan and hydrochlorothiazide  2.  Hyperlipidemia controlled on atorvastatin and aspirin  3.  Chronic back problems uses an occasional cyclobenzaprine  4.  Overweight knows he needs work on weight loss  5.  Prediabetes with stable A1c  6.  Seborrheic keratoses treated wound care reviewed   .    Patient Instructions:       Counseled: Patient, Regarding diagnosis, Regarding treatment, Regarding medications, Diet.

## 2022-02-15 NOTE — TELEPHONE ENCOUNTER
Entered by Nacho Chandra CMA on April 30, 2019 10:40:46 AM CDT  ---------------------  From: Nacho Chandra CMA   To: [facility]    Sent: 4/30/2019 10:40:46 AM CDT  Subject: Medication Management     ** Submitted: **  Complete:hydroCHLOROthiazide (hydroCHLOROthiazide 12.5 mg oral capsule)   Signed by Nacho Chandra CMA  4/30/2019 10:40:00 AM    ** Approved with modifications: **  hydroCHLOROthiazide (HYDROCHLOROTHIAZIDE 12.5MG CAPS)  TAKE ONE CAPSULE BY MOUTH EVERY DAY  Qty:  30 unknown unit        Days Supply:  30        Refills:  0          Substitutions Allowed     Route To Pharmacy - [facility]   Note to Pharmacy:  Pt due for visit for further refills  Signed by Nacho Chandra CMA            ------------------------------------------  From: [facility]  To: Samuel Zacarias MD  Sent: April 29, 2019 9:14:57 AM CDT  Subject: Medication Management  Due: April 30, 2019 9:14:57 AM CDT    ** On Hold Pending Signature **  Drug: hydroCHLOROthiazide (Microzide 12.5 mg oral capsule (obsolete))  TAKE ONE CAPSULE BY MOUTH EVERY DAY  Quantity: 30 unknown unit  Days Supply: 30        Refills: 5  Substitutions Allowed  Notes from Pharmacy:     Dispensed Drug: hydroCHLOROthiazide (hydroCHLOROthiazide 12.5 mg oral capsule)  TAKE ONE CAPSULE BY MOUTH EVERY DAY  Quantity: 30 unknown unit  Days Supply: 30        Refills: 5  Substitutions Allowed  Notes from Pharmacy:   ------------------------------------------

## 2022-02-15 NOTE — PROGRESS NOTES
Chief Complaint    Right elbow swelling, minimal pain.  Did have insect bite 3 weeks ago and again last week.  History of Present Illness       Alec presents to the clinic with a 3-week history of right elbow swelling.  He denies any specific trauma though did have an insect bite to the area prior to the swelling starting.  Shortly after the initial insect bite it became very ecchymotic around the area of swelling.  The symptoms slowly improved and then have worsened again over the last week.  He does note a new lesion consistent with an insect bite again.  He denies any significant pain and has been functional and has noted normal range of motion at the elbow.  He is retired and right-hand dominant.  He has tried ibuprofen without improvement.  Review of Systems      Review of systems is negative with the exception of those noted in HPI          Physical Exam   Vitals & Measurements    T: 97.0  F (Tympanic)  HR: 64 (Peripheral)  BP: 154/77     HT: 73 in  WT: 262.4 lb  BMI: 34.62        Examination of the right elbow reveals a fluctuant mass overlying the olecranon consistent with olecranon bursitis.  There is mild ecchymosis present there is no erythema or tenderness to palpation.  No warmth.  Patient has full active range of motion in flexion and extension pronation and supination at the right elbow.  Assessment/Plan       1. Olecranon bursitis, right elbow (M70.21)       Discussed with patient etiology and typical treatment of olecranon bursitis.  Recommend compression with Ace bandage and protection with an elbow pad.  He will observe for signs of infection including erythema warmth and worsening symptoms.  Follow-up for persistent or worsening symptoms on an as-needed basis.  PI given and discussed.  Patient Information     Name:ALEC VEGA      Address:      [address] [id number]     Sex:Male     YOB: 1955     [phone]     Emergency  Contact:YESSI VEGA     MRN:[id number]     FIN:[id number]     Location:[facility]     Date of Service:06/06/2021      Primary Care Physician:       Samuel Zacarias MD, [phone]      Attending Physician:       Shannon Mari PA-C, [phone]  Problem List/Past Medical History    Ongoing     Chronic tension headaches     Dyslipidemia     FH: stroke     Hypertension     Hypertriglyceridemia     Obese    Historical     CT Cardiac Calcium Score       Comments: Date of test 8/7/14 Score: 141  Procedure/Surgical History     Screening for malignant neoplasm of colon (01/13/2020)            Comments: Cologuard - Negative.     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     Flexible sigmoidoscopy (05/20/2011)           Flexible sigmoidoscopy (02/09/2006)           Arthroscopy of shoulder (12/29/1998)            Comments: Right..     Breast surgery            Comments: As a child..     Bunionectomy        Medications    amLODIPine 10 mg oral tablet, 1 tab(s), Oral, daily, 1 refills    aspirin 81 mg oral tablet, 81 mg= 1 tab(s), Oral, daily, 11 refills    atorvastatin 20 mg oral tablet, 1 tab(s), Oral, daily, 1 refills    cyclobenzaprine 10 mg oral tablet, 1 tab(s), Oral, tid    hydroCHLOROthiazide 12.5 mg oral capsule, 1 cap(s), Oral, daily, 1 refills    losartan 100 mg oral tablet, 1 tab(s), Oral, daily, 1 refills  Allergies    Latex (rash)    No Known Medication Allergies  Social History    Smoking Status     Former smoker     Alcohol      Current, socially     Electronic Cigarette/Vaping      Electronic Cigarette Use: Never.     Employment/School      Retired     Exercise      Exercise type: Walking.     Home/Environment      Marital status: . Spouse/Partner name: Mary. Risks in environment: Does not wear helmet.     Nutrition/Health      Type of diet: Regular.     Sexual      Sexually active: Yes. Sexual orientation: Straight or heterosexual.  Contraceptive Use Details: None.     Substance Abuse      Never     Tobacco      Former smoker, quit more than 30 days ago  Family History    Brother: History is negative    Brother: History is negative  Immunizations       Scheduled Immunizations       Dose Date(s)       tetanus/diphth/pertuss (Tdap) adult/adol       01/02/2014       Other Immunizations               tetanus/diphth/pertuss (Tdap) adult/adol       06/04/2008       ZOS, shingles       08/10/2016       pneumococcal (PCV13)       07/21/2020       SARS-CoV-2 (COVID-19) Moderna-1273       03/10/2021, 04/07/2021

## 2022-02-15 NOTE — TELEPHONE ENCOUNTER
Entered by Nacho Chandra CMA on April 30, 2019 10:39:42 AM CDT  ---------------------  From: Nacho Chandra CMA   To: [facility]    Sent: 4/30/2019 10:39:42 AM CDT  Subject: Medication Management     ** Submitted: **  Complete:atorvastatin (Lipitor 20 mg oral tablet)   Signed by Nacho Chandra CMA  4/30/2019 10:39:00 AM    ** Approved with modifications: **  atorvastatin (ATORVASTATIN CALCIUM 20MG TABS)  TAKE ONE TABLET BY MOUTH EVERY DAY  Qty:  30 unknown unit        Days Supply:  30        Refills:  0          Substitutions Allowed     Route To Pharmacy - [facility]   Note to Pharmacy:  Pt due for visit for further refills  Signed by Nacho Chandra CMA            ------------------------------------------  From: [facility]  To: Samuel Zacarias MD  Sent: April 29, 2019 9:14:56 AM CDT  Subject: Medication Management  Due: April 30, 2019 9:14:56 AM CDT    ** On Hold Pending Signature **  Drug: atorvastatin (Lipitor 20 mg oral tablet)  TAKE ONE TABLET BY MOUTH EVERY DAY  Quantity: 30 unknown unit  Days Supply: 30        Refills: 5  Substitutions Allowed  Notes from Pharmacy:     Dispensed Drug: atorvastatin (atorvastatin 20 mg oral tablet)  TAKE ONE TABLET BY MOUTH EVERY DAY  Quantity: 30 unknown unit  Days Supply: 30        Refills: 5  Substitutions Allowed  Notes from Pharmacy:   ------------------------------------------Med Refill      Date of last office visit and reason:  Preop 2-6-19      Date of last Med Check / Px:   10-31-18  Date of last labs pertaining to med:  BMP 2-6-19, Lipid 4-9-18    Note:  Rx filled per protocol.  Nacho Chandra CMA    RTC order in chart:  yes    For Protocol refill, has patient been contacted:  _

## 2022-02-15 NOTE — NURSING NOTE
Quick Intake Entered On:  2/18/2021 10:33 AM CST    Performed On:  2/18/2021 10:33 AM CST by Samuel Zacarias MD               Summary   Height Measured :   73 in(Converted to: 6 ft 1 in, 185.42 cm)    Systolic Blood Pressure :   128 mmHg   Diastolic Blood Pressure :   76 mmHg   Mean Arterial Pressure :   93 mmHg   Samuel Zacarias MD - 2/18/2021 10:33 AM CST

## 2022-02-15 NOTE — NURSING NOTE
Comprehensive Intake Entered On:  2/6/2019 9:59 AM CST    Performed On:  2/6/2019 9:55 AM CST by Annabelle Boyd CMA               Summary   Chief Complaint :   Pre-op for Right knee replacement with Dr. Rees  at [facility] on 2/20.   Weight Measured :   249 lb(Converted to: 249 lb 0 oz, 112.94 kg)    Height Measured :   73 in(Converted to: 6 ft 1 in, 185.42 cm)    Body Mass Index :   32.85 kg/m2 (HI)    Body Surface Area :   2.41 m2   Systolic Blood Pressure :   127 mmHg   Diastolic Blood Pressure :   77 mmHg   Mean Arterial Pressure :   94 mmHg   Peripheral Pulse Rate :   66 bpm   Temperature Tympanic :   97.2 DegF(Converted to: 36.2 DegC)  (LOW)    Annabelle Boyd CMA - 2/6/2019 9:55 AM CST   Health Status   Allergies Verified? :   Yes   Medication History Verified? :   Yes   Pre-Visit Planning Status :   Completed   Tobacco Use? :   Former smoker   Annabelle Boyd CMA - 2/6/2019 9:55 AM CST   Consents   Consent for Immunization Exchange :   Consent Granted   Consent for Immunizations to Providers :   Consent Granted   Annabelle Boyd CMA - 2/6/2019 9:55 AM CST   Meds / Allergies   (As Of: 2/6/2019 9:59:37 AM CST)   Allergies (Active)   Latex  Estimated Onset Date:   Unspecified ; Reactions:   rash ; Created By:   Tracee Gonzales RN; Reaction Status:   Active ; Category:   Drug ; Substance:   Latex ; Type:   Allergy ; Updated By:   Tracee Gonzales RN; Reviewed Date:   4/17/2018 2:18 PM CDT        Medication List   (As Of: 2/6/2019 9:59:37 AM CST)   Prescription/Discharge Order    amLODIPine  :   amLODIPine ; Status:   Prescribed ; Ordered As Mnemonic:   amLODIPine 10 mg oral tablet ; Simple Display Line:   10 mg, 1 tab(s), po, daily, 90 tab(s), 1 Refill(s) ; Ordering Provider:   Samuel Zacarias MD; Catalog Code:   amLODIPine ; Order Dt/Tm:   10/31/2018 11:46:29 AM          atorvastatin  :   atorvastatin ; Status:   Prescribed ; Ordered As Mnemonic:   Lipitor 20 mg oral tablet ; Simple  Display Line:   20 mg, 1 tab(s), PO, Daily, 90 tab(s), 1 Refill(s) ; Ordering Provider:   Samuel Zacarias MD; Catalog Code:   atorvastatin ; Order Dt/Tm:   10/31/2018 11:46:31 AM          cyclobenzaprine 10 mg oral tablet  :   cyclobenzaprine 10 mg oral tablet ; Status:   Prescribed ; Ordered As Mnemonic:   cyclobenzaprine 10 mg oral tablet ; Simple Display Line:   See Instructions, TAKE ONE TABLET BY MOUTH THREE TIMES A DAY, 30 unknown unit ; Ordering Provider:   Chema Delacruz PA-C; Catalog Code:   cyclobenzaprine ; Order Dt/Tm:   10/26/2018 8:54:12 AM          hydroCHLOROthiazide  :   hydroCHLOROthiazide ; Status:   Prescribed ; Ordered As Mnemonic:   hydroCHLOROthiazide 12.5 mg oral capsule ; Simple Display Line:   12.5 mg, 1 cap(s), po, daily, 90 cap(s), 1 Refill(s) ; Ordering Provider:   Samuel Zacarias MD; Catalog Code:   hydroCHLOROthiazide ; Order Dt/Tm:   10/31/2018 11:46:32 AM          losartan  :   losartan ; Status:   Prescribed ; Ordered As Mnemonic:   losartan 100 mg oral tablet ; Simple Display Line:   100 mg, 1 tab(s), PO, Daily, 90 tab(s), 1 Refill(s) ; Ordering Provider:   Samuel Zacarias MD; Catalog Code:   losartan ; Order Dt/Tm:   10/31/2018 11:29:19 AM            Home Meds    aspirin  :   aspirin ; Status:   Documented ; Ordered As Mnemonic:   aspirin 81 mg oral tablet ; Simple Display Line:   81 mg, 1 tab(s), po, daily ; Catalog Code:   aspirin ; Order Dt/Tm:   2/25/2015 2:22:12 PM

## 2022-02-15 NOTE — NURSING NOTE
Phone Message    PCP:   [facility]      Time of Call:  1009       Person Calling:  Mary Rivera pt's wife  Phone number:  [phone]    Returned call at: 1043    Note:   Patient's wife called stating that he was out of his Hydrochlorothiazide and Amlodipine and was in need of a refill. 30 day supply was sent in per protocol for both medications. Called patient and informed him refills were sent. Also informed him that he will be due this month for his 6 month chronic disease follow up w/TFS. He expressed understanding.     Last office visit and reason:  6-5-19 Hypertension w/TFS

## 2022-02-15 NOTE — TELEPHONE ENCOUNTER
Entered by Ban Arceo on January 19, 2021 10:44:00 AM CST  ---------------------  From: Ban Arceo   To: [facility]    Sent: 1/19/2021 10:43:59 AM CST  Subject: Medication Management     ** Submitted: **  Order:hydroCHLOROthiazide (hydroCHLOROthiazide 12.5 mg oral capsule)  1 cap(s)  Oral  daily  Qty:  30 cap(s)        Duration:  30 day(s)        Refills:  0          Substitutions Allowed     Route To [facility]    Signed by Ban Arceo  1/19/2021 4:43:00 PM [facility]    ** Submitted: **  Complete:hydroCHLOROthiazide (hydroCHLOROthiazide 12.5 mg oral capsule)   Signed by Ban Arceo  1/19/2021 4:43:00 PM [facility]    ** Not Approved:  **  hydroCHLOROthiazide (HYDROCHLOROTHIAZIDE 12.5MG CAPS)  TAKE ONE CAPSULE BY MOUTH EVERY DAY  Qty:  90 unknown unit        Days Supply:  90        Refills:  1          Substitutions Allowed     Route To [facility]   Signed by Ban Arceo            ** Submitted: **  Order:atorvastatin (atorvastatin 20 mg oral tablet)  1 tab(s)  Oral  daily  Qty:  30 tab(s)        Duration:  30 day(s)        Refills:  0          Substitutions Allowed     Route To [facility]    Signed by Ban Arceo  1/19/2021 4:43:00 PM [facility]    ** Submitted: **  Complete:atorvastatin (atorvastatin 20 mg oral tablet)   Signed by Ban Arceo  1/19/2021 4:43:00 PM [facility]    ** Not Approved:  **  atorvastatin (ATORVASTATIN CALCIUM 20MG TABS)  TAKE ONE TABLET BY MOUTH EVERY DAY  Qty:  90 unknown unit        Days Supply:  90        Refills:  1          Substitutions Allowed     Route To [facility]   Signed by Ban Arceo            ** Submitted: **  Order:amLODIPine (amLODIPine 10 mg oral tablet)  1 tab(s)  Oral  daily  Qty:  30 tab(s)        Duration:  30 day(s)        Refills:  0          Substitutions Allowed     Route To  [facility]    Signed by Ban Arceo  1/19/2021 4:42:00 PM [facility]    ** Submitted: **  Complete:amLODIPine (amLODIPine 10 mg oral tablet)   Signed by Ban Arceo  1/19/2021 4:42:00 PM [facility]    ** Not Approved:  **  amLODIPine (AMLODIPINE BESYLATE 10MG TABS)  TAKE ONE TABLET BY MOUTH EVERY DAY  Qty:  90 unknown unit        Days Supply:  90        Refills:  1          Substitutions Allowed     Route To [facility]   Signed by Ban Arceo            ** Submitted: **  Order:losartan (losartan 100 mg oral tablet)  1 tab(s)  Oral  daily  Qty:  30 tab(s)        Duration:  30 day(s)        Refills:  0          Substitutions Allowed     Route To [facility]    Signed by Ban Arceo  1/19/2021 4:41:00 PM [facility]    ** Submitted: **  Complete:losartan (losartan 100 mg oral tablet)   Signed by Ban Arceo  1/19/2021 4:42:00 PM [facility]    ** Not Approved:  **  losartan (LOSARTAN POTASSIUM 100MG TABS)  TAKE ONE TABLET BY MOUTH EVERY DAY  Qty:  90 unknown unit        Days Supply:  90        Refills:  1          Substitutions Allowed     Route To [facility]   Signed by Ban Arceo            ------------------------------------------  [facility]  To: Samuel Zacarias MD  Sent: January 18, 2021 9:32:31 AM CST  Subject: Medication Management  Due: January 15, 2021 8:19:52 PM CST     ** On Hold Pending Signature **     Drug: amLODIPine (amLODIPine 10 mg oral tablet), TAKE ONE TABLET BY MOUTH EVERY DAY  Quantity: 90 unknown unit  Days Supply: 90  Refills: 1  Substitutions Allowed  Notes from Pharmacy:     Dispensed Drug: amLODIPine (amLODIPine 10 mg oral tablet), TAKE ONE TABLET BY MOUTH EVERY DAY  Quantity: 90 unknown unit  Days Supply: 90  Refills: 1  Substitutions Allowed  Notes from Pharmacy:     ** On Hold Pending Signature **     Drug:  hydroCHLOROthiazide (hydroCHLOROthiazide 12.5 mg oral capsule), TAKE ONE CAPSULE BY MOUTH EVERY DAY  Quantity: 90 unknown unit  Days Supply: 90  Refills: 1  Substitutions Allowed  Notes from Pharmacy:     Dispensed Drug: hydroCHLOROthiazide (hydroCHLOROthiazide 12.5 mg oral capsule), TAKE ONE CAPSULE BY MOUTH EVERY DAY  Quantity: 90 unknown unit  Days Supply: 90  Refills: 1  Substitutions Allowed  Notes from Pharmacy:     ** On Hold Pending Signature **     Drug: losartan (losartan 100 mg oral tablet), TAKE ONE TABLET BY MOUTH EVERY DAY  Quantity: 90 unknown unit  Days Supply: 90  Refills: 1  Substitutions Allowed  Notes from Pharmacy:     Dispensed Drug: losartan (losartan 100 mg oral tablet), TAKE ONE TABLET BY MOUTH EVERY DAY  Quantity: 90 unknown unit  Days Supply: 90  Refills: 1  Substitutions Allowed  Notes from Pharmacy:     ** On Hold Pending Signature **     Drug: atorvastatin (atorvastatin 20 mg oral tablet), TAKE ONE TABLET BY MOUTH EVERY DAY  Quantity: 90 unknown unit  Days Supply: 90  Refills: 1  Substitutions Allowed  Notes from Pharmacy:     Dispensed Drug: atorvastatin (atorvastatin 20 mg oral tablet), TAKE ONE TABLET BY MOUTH EVERY DAY  Quantity: 90 unknown unit  Days Supply: 90  Refills: 1  Substitutions Allowed  Notes from Pharmacy:  ------------------------------------------Date of last office visit and reason: 7/21/20 hypertension      Date of last labs pertaining to med:  7/7/20    Note:  Refilled per protocol. Visit due this month    RTC order in chart:  yes    For Protocol refill, has patient been contacted:  Letter will go out in a couple days

## 2022-02-15 NOTE — TELEPHONE ENCOUNTER
Entered by Katie Lang on August 19, 2020 9:50:54 AM CDT  PCP:   [facility]    Medication:   Cyclobenzaprine   Last Filled:  4/1/20   Quantity:  30 Refills:  1    Date of last office visit and reason:   7/21/20 HTN  Date of last labs pertaining to condition:  _    Note:  Please advise on refill. Pt should have been out of med a while ago.     Return to Clinic order placed?  Placed 7/21/20; Return in 6 months    Resource:   _  Phone:   _            ------------------------------------------  From: [facility]  To: Samuel Zacarias MD  Sent: August 19, 2020 8:51:11 AM CDT  Subject: Medication Management  Due: August 12, 2020 4:14:54 PM CDT     ** On Hold Pending Signature **     Drug: cyclobenzaprine (cyclobenzaprine 10 mg oral tablet), TAKE ONE TABLET BY MOUTH THREE TIMES A DAY  Quantity: 30 unknown unit  Days Supply: 10  Refills: 1  Substitutions Allowed  Notes from Pharmacy:     Dispensed Drug: cyclobenzaprine (cyclobenzaprine 10 mg oral tablet), TAKE ONE TABLET BY MOUTH THREE TIMES A DAY  Quantity: 30 unknown unit  Days Supply: 10  Refills: 1  Substitutions Allowed  Notes from Pharmacy:  ---------------------------------------------------------------  From: Katie Lang (eRx Pool ([id number]_[facility]))   To: [facility] Message Pool ([id number]_[place] [facility] [place]);     Sent: 8/19/2020 9:51:02 AM CDT  Subject: FW: Medication Management   Due Date/Time: 8/20/2020 8:51:00 AM CDT---------------------  From: Julia Palencia (TFS Message Pool ([id number]_[facility]))   To: Samuel Zacarias MD;     Sent: 8/19/2020 10:04:26 AM CDT  Subject: FW: Medication Management   Due Date/Time: 8/20/2020 8:51:00 AM CDT---------------------  From: Samuel Zacarias MD   To: [facility]    Sent: 8/19/2020 10:44:16 AM CDT  Subject: FW: Medication Management     ** Submitted: **  Complete:cyclobenzaprine (cyclobenzaprine 10 mg oral tablet)   Signed by Samuel Zacarias MD  8/19/2020  3:44:00 PM [facility]    ** Approved with modifications: **  cyclobenzaprine (CYCLOBENZAPRINE HCL 10MG TABS)  TAKE ONE TABLET BY MOUTH THREE TIMES A DAY  Qty:  30 unknown unit        Days Supply:  10        Refills:  1          Substitutions Allowed     [facility]

## 2022-02-15 NOTE — PROGRESS NOTES
Patient:   ALEC VEGA            MRN: [id number]            FIN: [id number]               Age:   63 years     Sex:  Male     :  1955   Associated Diagnoses:   Hypertension; Dyslipidemia; Blood Glucose Elevated   Author:   Samuel Zacarias MD      Visit Information      Date of Service: 10/31/2018 11:19 am  Performing Location: [facility]  Encounter#: [id number]      Primary Care Provider (PCP):  Samuel Zacarias MD    NPI# [id number]      Referring Provider:  Samuel Zacarias MD# [id number]      Chief Complaint   10/31/2018 11:22 AM CDT  HTN check up.        History of Present Illness             The patient presents for follow-up evaluation of hypertension.  The quality of hypertension symptom(s) since the patient's last visit is described as being unchanged.  Exacerbating factors consist of noncompliance with diet.  Relieving factors consist of medication.  Associated symptoms consist of none.     chief complaint and symptoms as noted above confirmed with patient   meds as directed rare use of flexeril for back      Review of Systems   Constitutional:  Negative.    Eye:  Negative.    Ear/Nose/Mouth/Throat:  Negative.    Respiratory:  Negative.    Cardiovascular:  Negative.    Gastrointestinal:  Negative.    Genitourinary:  Negative.    Hematology/Lymphatics:  Negative.    Endocrine:  Negative.    Immunologic:  Negative.    Musculoskeletal:  Negative except as documented in history of present illness.    Integumentary:  Negative.    Neurologic:  Negative.       Health Status   Allergies:    Canceled/Inactive Reactions (Selected)  No known allergies   Medications:  (Selected)   Prescriptions  Prescribed  Lipitor 20 mg oral tablet: 1 tab(s) ( 20 mg ), PO, Daily, # 90 tab(s), 1 Refill(s), Type: Maintenance, Pharmacy: [facility], 1 tab(s) po daily  amLODIPine 10 mg oral tablet: 1 tab(s) ( 10 mg ), po, daily, # 90 tab(s), 1 Refill(s), Type: Soft Stop,  Pharmacy: [facility], 1 tab(s) po daily  cyclobenzaprine 10 mg oral tablet: See Instructions, Instructions: TAKE ONE TABLET BY MOUTH THREE TIMES A DAY, # 30 unknown unit, Type: Soft Stop, Pharmacy: [facility]  hydroCHLOROthiazide 12.5 mg oral capsule: 1 cap(s) ( 12.5 mg ), po, daily, # 90 cap(s), 1 Refill(s), Type: Maintenance, Pharmacy: [facility], needs appt for further refills, 1 cap(s) po daily  losartan 100 mg oral tablet: = 1 tab(s) ( 100 mg ), PO, Daily, # 90 tab(s), 1 Refill(s), Type: Maintenance, Pharmacy: [facility], 1 tab(s) Oral daily  Documented Medications  Documented  aspirin 81 mg oral tablet: 1 tab(s) ( 81 mg ), po, daily, 0 Refill(s), Type: Maintenance   Problem list:    All Problems  Biceps tendonitis / SNOMED CT 622914941 / Confirmed  Chronic tension headaches / SNOMED CT 901610274 / Confirmed  Dyslipidemia / SNOMED CT 3154882450 / Confirmed  FH: stroke / SNOMED CT 974787804 / Confirmed  Hypertension / SNOMED CT 4358771908 / Confirmed  Hypertriglyceridemia / SNOMED CT 891628485 / Confirmed  Obese / SNOMED CT 3276159859 / Probable  Resolved: CT Cardiac Calcium Score      Histories   Past Medical History:    Active  Biceps tendonitis (875176639): Onset on 2/27/2010 at 54 years.  Comments:  4/20/2011 CDT 10:35 AM CDT - Geovanna Hui.  Hypertriglyceridemia (220594518): Onset on 6/4/2008 at 53 years.  Obese (2827041108)  FH: stroke (708780355)  Dyslipidemia (0967178329)  Chronic tension headaches (567024110)  Resolved  CT Cardiac Calcium Score:  Resolved.  Comments:  8/20/2014 CDT 1:07 PM CDT - Julia Palencia  Date of test 8/7/14  Score: 141   Family History:       Procedure history:    Prosthetic hemiarthroplasty of joint (SNOMED CT 2161414701) on 4/15/2014 at 58 Years.  Comments:  10/8/2015 6:35 PM - Jayme PA-C, Chema W.  left medial knee  Flexible sigmoidoscopy (SNOMED CT 36889628) on  5/20/2011 at 56 Years.  Flexible sigmoidoscopy (SNOMED CT 57766992) on 2/9/2006 at 50 Years.  Arthroscopy of shoulder (SNOMED CT 426021845) on 12/29/1998 at 43 Years.  Comments:  4/20/2011 10:52 AM - TensasGeovanna levi  Right.  Bunionectomy (SNOMED CT 44320106).  Breast surgery (SNOMED CT 2874210823).  Comments:  4/20/2011 10:47 AM - Ez Geovanna  As a child.   Social History:        Alcohol Assessment            Current, socially      Tobacco Assessment            Former smoker, quit more than 30 days ago      Substance Abuse Assessment            Never      Employment and Education Assessment            Retired      Home and Environment Assessment            Marital status: .  Spouse/Partner name: Mary.  Risks in environment: Does not wear helmet.      Nutrition and Health Assessment            Type of diet: Regular.      Exercise and Physical Activity Assessment            Exercise type: Walking.      Sexual Assessment            Sexually active: Yes.  Sexual orientation: Straight or heterosexual.  Contraceptive Use Details: None.        Physical Examination   Vital Signs   10/31/2018 11:25 AM CDT Systolic Blood Pressure 138 mmHg  HI    Diastolic Blood Pressure 78 mmHg    Mean Arterial Pressure 98 mmHg   10/31/2018 11:22 AM CDT Temperature Tympanic 97.7 DegF  LOW    Peripheral Pulse Rate 71 bpm    HR Method Electronic    Systolic Blood Pressure 144 mmHg  HI    Diastolic Blood Pressure 75 mmHg    Mean Arterial Pressure 98 mmHg    BP Method Electronic      Measurements from flowsheet : Measurements   10/31/2018 11:22 AM CDT  Weight Measured - Standard                253.2 lb     General:  Alert and oriented, No acute distress.    Eye:  Normal conjunctiva.    HENT:  Normocephalic, Tympanic membranes are clear, Oral mucosa is moist, No pharyngeal erythema.    Neck:  Supple, Non-tender, No lymphadenopathy, No thyromegaly.    Respiratory:  Breath sounds are equal, Symmetrical chest wall expansion.          Respirations: Are within normal limits.         Pattern: Regular.         Breath sounds: Bilateral, Within normal limits.    Cardiovascular:  Normal rate, Regular rhythm, No murmur, Normal peripheral perfusion, No edema.    Gastrointestinal:  Soft, Non-tender, Non-distended, Normal bowel sounds, No organomegaly.    Genitourinary:  No costovertebral angle tenderness.    Musculoskeletal:  mild tenderness anterior r ankle.    Integumentary:  Warm, Dry, No rash.       Health Maintenance      Recommendations     Pending (in the next year)        OverDue           Colorectal Cancer Screen (Occult Blood) (Male) due  05/03/13  and every 1  year(s)           Colorectal Cancer Screen (Sigmoidoscopy) (Male) due  05/20/16  and every 5  year(s)        Due            Hepatitis C Screen 6391-1756 (Male) due  10/31/18  One-time only           Influenza Vaccine due  10/31/18  and every 1  year(s)           Lung Cancer Screen (Male) due  10/31/18  and every 1  year(s)        Due In Future            Lipid Disorders Screen (Male) not due until  04/09/19  and every 1  year(s)           Type 2 Diabetes Mellitus Screen (Male) not due until  04/09/19  and every 1  year(s)           Depression Screen (Male) not due until  04/17/19  and every 1  year(s)           Body Mass Index Check (Male) not due until  04/17/19  and every 1  year(s)     Satisfied (in the past 1 year)        Satisfied            Alcohol Misuse Screen (Male) on  04/17/18.           Body Mass Index Check (Male) on  04/17/18.           Depression Screen (Male) on  04/17/18.           Depression Screen (Male) on  04/17/18.           Depression Screen (Male) on  04/17/18.           High Blood Pressure Screen (Male) on  10/31/18.           High Blood Pressure Screen (Male) on  10/31/18.           High Blood Pressure Screen (Male) on  08/08/18.           High Blood Pressure Screen (Male) on  08/08/18.           High Blood Pressure Screen (Male) on  04/17/18.           Lipid  Disorders Screen (Male) on  04/09/18.           Lipid Disorders Screen (Male) on  04/09/18.           Lipid Disorders Screen (Male) on  04/09/18.           Lipid Disorders Screen (Male) on  04/09/18.           Obesity Screen and Counseling (Male) on  10/31/18.           Obesity Screen and Counseling (Male) on  04/17/18.           Tobacco Use Screen (Male) on  10/31/18.           Tobacco Use Screen (Male) on  04/17/18.           Type 2 Diabetes Mellitus Screen (Male) on  04/09/18.          Review / Management   Results review      Impression and Plan   Diagnosis     Hypertension (SOP43-HL I10).     Dyslipidemia (GDD22-NH E78.5).     Blood Glucose Elevated (KXZ16-PK R73.09).     Course:  Not progressing as expected.    Plan:  Monitor blood pressure, Weight monitoring, fu 6mo, labs then BMI,Weight loss,exercise,diet discussed  increase arb  6 mo fu.    Patient Instructions:       Counseled: Patient, Regarding diagnosis, Regarding treatment, Regarding medications, Diet.

## 2022-02-15 NOTE — PROGRESS NOTES
Patient:   ALEC VEGA            MRN: [id number]            FIN: [id number]               Age:   62 years     Sex:  Male     :  1955   Associated Diagnoses:   Annual exam; Hypertension; Hypertriglyceridemia; Prediabetes   Author:   Samuel Zacarias MD      Visit Information      Date of Service: 2018 01:45 pm  Performing Location: [facility]  Encounter#: [id number]      Primary Care Provider (PCP):  Samuel Zacarias MD    NPI# [id number]      Referring Provider:  Samuel Zacarias MD# [id number]      Chief Complaint   2018 1:48 PM CDT    Patient presents for annual physical exam and refill medications.     Routine Health Care Visit      History of Present Illness   Doing well no concerns             The patient presents for a general exam.  The patient's general health status is described as good.  The patient's diet is described as balanced.  Exercise: occasional.  Associated symptoms consist of none.  Complaint: Taking medications as directed. .  Additional pertinent history: occasional caffeine use, tobacco use none and alcohol use socially.        Review of Systems   Constitutional:  Negative.    Eye:  Negative.    Ear/Nose/Mouth/Throat:  Negative.    Respiratory:  Negative.    Cardiovascular:  Negative.    Gastrointestinal:  Negative.    Genitourinary:  Negative.    Hematology/Lymphatics:  Negative.    Endocrine:  Negative.    Immunologic:  Negative.    Musculoskeletal:  Negative.    Integumentary:  Negative.    Neurologic:  Negative.    Psychiatric:  Negative.    All other systems reviewed and negative      Health Status   Allergies:    Allergic Reactions (Selected)  Severity Not Documented  Latex (Rash)   Medications:  (Selected)   Prescriptions  Prescribed  Flexeril 10 mg oral tablet: 1 tab(s) ( 10 mg ), PO, TID, PRN: for spasm and pain, # 90 tab(s), 0 Refill(s), Type: Maintenance, Pharmacy: [facility], Pt now due for 6month med check  per TFS, 1 tab(s) po tid,PRN:for spasm and pain  Lipitor 20 mg oral tablet: 1 tab(s) ( 20 mg ), PO, Daily, # 90 tab(s), 1 Refill(s), Type: Maintenance, Pharmacy: [facility], 1 tab(s) po daily  amLODIPine 10 mg oral tablet: See Instructions, Instructions: TAKE ONE TABLET BY MOUTH EVERY DAY, # 30 unknown unit, Type: Soft Stop, Pharmacy: [facility]  hydroCHLOROthiazide 12.5 mg oral capsule: 1 cap(s) ( 12.5 mg ), po, daily, # 30 cap(s), 0 Refill(s), Type: Maintenance, Pharmacy: [facility], needs appt for further refills, 1 cap(s) po daily  losartan 50 mg oral tablet: 1 tab(s) ( 50 mg ), PO, Daily, # 90 tab(s), 1 Refill(s), Type: Maintenance, Pharmacy: [facility], 1 tab(s) po daily  Documented Medications  Documented  aspirin 81 mg oral tablet: 1 tab(s) ( 81 mg ), po, daily, 0 Refill(s), Type: Maintenance,    Medications          *denotes recorded medication          amLODIPine 10 mg oral tablet: See Instructions, TAKE ONE TABLET BY MOUTH EVERY DAY, 30 unknown unit.          *aspirin 81 mg oral tablet: 81 mg, 1 tab(s), po, daily.          Lipitor 20 mg oral tablet: 20 mg, 1 tab(s), PO, Daily, 90 tab(s), 1 Refill(s).          Flexeril 10 mg oral tablet: 10 mg, 1 tab(s), PO, TID, PRN: for spasm and pain, 90 tab(s), 0 Refill(s).          hydroCHLOROthiazide 12.5 mg oral capsule: 12.5 mg, 1 cap(s), po, daily, 30 cap(s), 0 Refill(s).          losartan 50 mg oral tablet: 50 mg, 1 tab(s), PO, Daily, 90 tab(s), 1 Refill(s).     Problem list:    All Problems (Selected)  Biceps Tendonitis / ICD-9-.12 / Confirmed  Chronic Tension Headaches / ICD-9-.81 / Confirmed  Dyslipidemia / ICD-9-.4 / Confirmed  FH: stroke / SNOMED CT 788579850 / Confirmed  Hypertension / SNOMED CT 4508325486 / Confirmed  Hypertriglyceridemia / ICD-9-.1 / Confirmed  Obese / ICD-9-.00 / Probable      Histories   Past Medical History:     Active  Biceps Tendonitis (726.12): Onset on 2/27/2010 at 54 years.  Comments:  4/20/2011 CDT 10:35 AM CDT - Geovanna Hui  Acute.  Hypertriglyceridemia (272.1): Onset on 6/4/2008 at 53 years.  FH: stroke (160817406)  Dyslipidemia (272.4)  Chronic Tension Headaches (307.81)  Resolved  CT Cardiac Calcium Score:  Resolved.  Comments:  8/20/2014 CDT 1:07 PM CDT - Radu Julia ANN  Date of test 8/7/14  Score: 141   Family History:    No family history items have been selected or recorded.   Procedure history:    Prosthetic hemiarthroplasty of joint (SNOMED CT 8770708304) on 4/15/2014 at 58 Years.  Comments:  10/8/2015 6:35 PM - Chema Delacruz PA-C.  left medial knee  Flexible sigmoidoscopy (SNOMED CT 21703256) on 5/20/2011 at 56 Years.  Flexible sigmoidoscopy (SNOMED CT 88174450) on 2/9/2006 at 50 Years.  Arthroscopy of shoulder (SNOMED CT 198679633) on 12/29/1998 at 43 Years.  Comments:  4/20/2011 10:52 AM - Geovanna Hui  Right.  Bunionectomy (SNOMED CT 69126713).  Breast surgery (SNOMED CT 7084854682).  Comments:  4/20/2011 10:47 AM - Geovanna Hui  As a child.   Social History:        Alcohol Assessment: Current                     Comments:                      04/20/2011 - Geovanna Hui                     Social.      Tobacco Assessment: Denies Tobacco Use      Other Assessment            Marital status.                     Comments:                      04/20/2011 - Geovanna Hui                     .        Physical Examination   Vital Signs   4/17/2018 1:48 PM CDT Temperature Tympanic 98.3 DegF    Peripheral Pulse Rate 74 bpm    HR Method Electronic    Systolic Blood Pressure 144 mmHg  HI    Diastolic Blood Pressure 79 mmHg    Mean Arterial Pressure 101 mmHg    BP Site Right arm    BP Method Electronic    Oxygen Saturation 94 %      Measurements from flowsheet : Measurements   4/17/2018 1:48 PM CDT Height Measured - Standard 73 in    Weight Measured - Standard 249.2 lb    BSA 2.41 m2    Body  Mass Index 32.87 kg/m2  HI      General:  Alert and oriented.    Eye:  Pupils are equal, round and reactive to light, Normal conjunctiva.    HENT:  Normocephalic, Tympanic membranes are clear, Oral mucosa is moist.    Neck:  Supple, Non-tender, No lymphadenopathy, No thyromegaly.    Respiratory:  Breath sounds are equal, Symmetrical chest wall expansion.         Respirations: Are within normal limits.         Pattern: Regular.         Breath sounds: Bilateral, Within normal limits.    Cardiovascular:  Normal rate, Regular rhythm, No murmur, Good pulses equal in all extremities, Normal peripheral perfusion, No edema.    Breast:  No mass.         Lymph nodes: Within normal limits.    Gastrointestinal:  Soft, Non-tender, Non-distended, Normal bowel sounds.    Musculoskeletal:  No tenderness, No swelling, degenerative changes noted.    Integumentary:  Warm, Dry.    Neurologic:  No focal deficits.    Cognition and Speech:  Oriented, Speech clear and coherent.    Psychiatric:  Appropriate mood & affect, Normal judgment.       Health Maintenance      Recommendations     Pending (in the next year)        OverDue           Colorectal Cancer Screen (Occult Blood) (Male) due  05/03/13  and every 1  year(s)           Depression Screen (Male) due  06/19/14  and every 1  year(s)           Colorectal Cancer Screen (Sigmoidoscopy) (Male) due  05/20/16  and every 5  year(s)        Due            Hepatitis C Screen 8774-8349 (Male) due  04/17/18  One-time only           Influenza Vaccine due  04/17/18  and every 1  year(s)           Lung Cancer Screen (Male) due  04/17/18  and every 1  year(s)        Due In Future            Lipid Disorders Screen (Male) not due until  04/09/19  and every 1  year(s)           Type 2 Diabetes Mellitus Screen (Male) not due until  04/09/19  and every 1  year(s)     Satisfied (in the past 1 year)        Satisfied            Body Mass Index Check (Male) on  04/17/18.           Body Mass Index Check  (Male) on  09/06/17.           Body Mass Index Check (Male) on  06/30/17.           High Blood Pressure Screen (Male) on  04/17/18.           High Blood Pressure Screen (Male) on  09/06/17.           High Blood Pressure Screen (Male) on  06/30/17.           Lipid Disorders Screen (Male) on  04/09/18.           Lipid Disorders Screen (Male) on  04/09/18.           Lipid Disorders Screen (Male) on  04/09/18.           Lipid Disorders Screen (Male) on  04/09/18.           Obesity Screen and Counseling (Male) on  04/17/18.           Obesity Screen and Counseling (Male) on  09/06/17.           Obesity Screen and Counseling (Male) on  06/30/17.           Tobacco Use Screen (Male) on  04/17/18.           Tobacco Use Screen (Male) on  09/06/17.           Tobacco Use Screen (Male) on  06/30/17.           Type 2 Diabetes Mellitus Screen (Male) on  04/09/18.        Review / Management   Results review:  Lab results   4/9/2018 10:54 AM CDT Sodium Level 140 mmol/L    Potassium Level 4.4 mmol/L    Chloride Level 104 mmol/L    CO2 Level 28 mmol/L    Glucose Level 125 mg/dL  HI    BUN 16 mg/dL    Creatinine 0.99 mg/dL    BUN/Creat Ratio NOT APPLICABLE    eGFR 81 mL/min/1.73m2    eGFR African American 94 mL/min/1.73m2    Calcium Level 9.6 mg/dL    Hgb A1c 5.4    Cholesterol 138 mg/dL    Non-HDL 79    HDL 59 mg/dL    Chol/HDL Ratio 2.3    LDL 58    Triglyceride 129 mg/dL   .       Impression and Plan   Diagnosis     Annual exam (RFI92-BB Z00.00).     Hypertension (GTB45-EU I10).     Hypertriglyceridemia (NCE70-JZ E78.1).     Prediabetes (IWF09-BZ R73.03).     Course:  Not progressing as expected.    Plan:  fu 6 mo, BMI,Weight loss,exercise,diet discussed, increase arb.    Patient Instructions:       Counseled: Patient, Regarding diagnosis, Regarding treatment, Regarding medications, Diet, Activity, Verbalized understanding.    Counseled:  Patient, Routine exercise and healthy weight maintenance discussed.    Patient Instructions:   Return to clinic in one year for next routine health visit, or sooner if problems or concerns.

## 2022-02-15 NOTE — PROGRESS NOTES
Patient:   ALEC VEGA            MRN: [id number]            FIN: [id number]               Age:   61 years     Sex:  Male     :  1955   Associated Diagnoses:   Hypertension; Dyslipidemia; Blood Glucose Elevated; Changing skin lesion   Author:   Samuel Zacarias MD      Visit Information      Date of Service: 2017 03:05 pm  Performing Location: [facility]  Encounter#: [id number]      Primary Care Provider (PCP):  Samuel Zacarias MD    NPI# [id number]      Referring Provider:  No referring provider recorded for selected visit.      Chief Complaint   3/7/2017 3:11 PM CST     6 month HTN check up.        History of Present Illness             The patient presents for follow-up evaluation of hypertension.  The quality of hypertension symptom(s) since the patient's last visit is described as being unchanged.  Exacerbating factors consist of noncompliance with diet.  Relieving factors consist of medication.  Associated symptoms consist of none.     chief complaint and symptoms as noted above confirmed with patient   skin tags keep getting caught  also spot on nose          Review of Systems   Constitutional:  Negative.    Eye:  Negative.    Ear/Nose/Mouth/Throat:  Negative.    Respiratory:  Negative.    Cardiovascular:  Negative.    Gastrointestinal:  Negative.    Genitourinary:  Negative.    Hematology/Lymphatics:  Negative.    Endocrine:  Negative.    Immunologic:  Negative.    Musculoskeletal:  Negative except as documented in history of present illness.    Integumentary:  Negative except as documented in history of present illness.    Neurologic:  Negative.       Health Status   Allergies:    Canceled/Inactive Reactions (Selected)  No known allergies   Medications:  (Selected)   Prescriptions  Prescribed  Cozaar 25 mg oral tablet: 1 tab(s) ( 25 mg ), PO, Daily, # 90 tab(s), 1 Refill(s), Type: Maintenance, Pharmacy: [facility], 1 tab(s) po daily  Lipitor 20  mg oral tablet: 1 tab(s) ( 20 mg ), PO, Daily, # 90 tab(s), 1 Refill(s), Type: Maintenance, Pharmacy: [facility], 1 tab(s) po daily  amLODIPine 10 mg oral tablet: 1 tab(s) ( 10 mg ), po, daily, # 90 tab(s), 1 Refill(s), Type: Maintenance, Pharmacy: [facility], 1 tab(s) po daily  hydroCHLOROthiazide 12.5 mg oral capsule: 1 cap(s) ( 12.5 mg ), po, daily, # 90 cap(s), 1 Refill(s), Type: Maintenance, Pharmacy: [facility], 1 cap(s) po daily  Documented Medications  Documented  aspirin 81 mg oral tablet: 1 tab(s) ( 81 mg ), po, daily, 0 Refill(s), Type: Maintenance   Problem list:    All Problems  Hypertension / SNOMED CT 0593740497 / Confirmed  Obese / ICD-9-.00 / Probable  Biceps Tendonitis / ICD-9-.12 / Confirmed  FH: stroke / SNOMED CT 338963306 / Confirmed  Dyslipidemia / ICD-9-.4 / Confirmed  Hypertriglyceridemia / ICD-9-.1 / Confirmed  Chronic Tension Headaches / ICD-9-.81 / Confirmed  Resolved: CT Cardiac Calcium Score      Histories   Past Medical History:    Active  Biceps Tendonitis (726.12): Onset on 2/27/2010 at 54 years.  Comments:  4/20/2011 CDT 10:35 AM CDT - Geovanna Hui.  Hypertriglyceridemia (272.1): Onset on 6/4/2008 at 53 years.  FH: stroke (995915802)  Dyslipidemia (272.4)  Chronic Tension Headaches (307.81)  Resolved  CT Cardiac Calcium Score:  Resolved.  Comments:  8/20/2014 CDT 1:07 PM CDT - Julia Lovelace MA  Date of test 8/7/14  Score: 141   Family History:    No family history items have been selected or recorded.   Procedure history:    Prosthetic hemiarthroplasty of joint (SNOMED CT 7976608505) on 4/15/2014 at 58 Years.  Comments:  10/8/2015 6:35 PM - Chema Delacruz PA-C  left medial knee  Flexible sigmoidoscopy (SNOMED CT 56396327) on 5/20/2011 at 56 Years.  Flexible sigmoidoscopy (SNOMED CT 32973737) on 2/9/2006 at 50 Years.  Arthroscopy of shoulder (SNOMED CT 463246441) on  12/29/1998 at 43 Years.  Comments:  4/20/2011 10:52 AM - Geovanna Hui  Right.  Bunionectomy (SNOMED CT 51913086).  Breast surgery (SNOMED CT 3359134339).  Comments:  4/20/2011 10:47 AM - Gevoanna Hui  As a child.   Social History:        Alcohol Assessment: Current                     Comments:                      04/20/2011 - Krystyna Huian                     Social.      Tobacco Assessment: Denies Tobacco Use      Other Assessment            Marital status.                     Comments:                      04/20/2011 - Geovanna Hui                     .        Physical Examination   Vital Signs   3/7/2017 3:11 PM CST Temperature Tympanic 98.6 DegF    Peripheral Pulse Rate 76 bpm    Systolic Blood Pressure 128 mmHg    Diastolic Blood Pressure 78 mmHg    Mean Arterial Pressure 95 mmHg      Measurements from flowsheet : Measurements   3/7/2017 3:11 PM CST     Weight Measured - Standard                255.8 lb     General:  Alert and oriented, No acute distress.    Eye:  Normal conjunctiva.    HENT:  Normocephalic, Tympanic membranes are clear, Oral mucosa is moist, No pharyngeal erythema.    Neck:  Supple, Non-tender, No lymphadenopathy, No thyromegaly.    Respiratory:  Breath sounds are equal, Symmetrical chest wall expansion.         Respirations: Are within normal limits.         Pattern: Regular.         Breath sounds: Bilateral, Within normal limits.    Cardiovascular:  Normal rate, Regular rhythm, No murmur, Normal peripheral perfusion, No edema.    Gastrointestinal:  Soft, Non-tender, Non-distended, Normal bowel sounds, No organomegaly.    Genitourinary:  No costovertebral angle tenderness.    Musculoskeletal:  mild tenderness anterior r ankle.    Integumentary:  Warm, Dry, No rash, pearly .4cm lesion right nose    tags r axillae and groin painted with etoh removed with sterile scissors dressed with bandaid 10 total.       Review / Management   Results review      Impression and Plan    Diagnosis     Hypertension (VTM83-JP I10).     Dyslipidemia (BDQ57-FL E78.5).     Blood Glucose Elevated (ISK72-WV R73.09).     Changing skin lesion (YFV39-KD L98.9).     Course:  Progressing as expected.    Plan:  Monitor blood pressure, Weight monitoring, fu 6mo, skin care  plastics for nasal lesion  .    Patient Instructions:       Counseled: Patient, Regarding diagnosis, Regarding treatment, Regarding medications, Diet.

## 2022-02-15 NOTE — TELEPHONE ENCOUNTER
Entered by Mya Núñez CMA on December 30, 2019 5:27:28 PM CST  ---------------------  From: Mya Núñez CMA   To: [facility]    Sent: 12/30/2019 5:27:28 PM CST  Subject: Medication Management     ** Not Approved: Patient has requested refill too soon, 6 month supply sent 12/18/19 **  atorvastatin (ATORVASTATIN CALCIUM 20MG TABS)  TAKE ONE TABLET BY MOUTH EVERY DAY  Qty:  30 unknown unit        Days Supply:  30        Refills:  0          Substitutions Allowed     Route To Pharmacy - [facility]   Signed by Mya Núñez CMA            ------------------------------------------  From: [facility]  To: Samuel Zacarias MD  Sent: December 30, 2019 1:33:31 PM CST  Subject: Medication Management  Due: December 31, 2019 1:33:31 PM CST    ** On Hold Pending Signature **  Drug: atorvastatin (atorvastatin 20 mg oral tablet)  TAKE ONE TABLET BY MOUTH EVERY DAY  Quantity: 30 unknown unit  Days Supply: 30  Refills: 0  Substitutions Allowed  Notes from Pharmacy:     Dispensed Drug: atorvastatin (atorvastatin 20 mg oral tablet)  TAKE ONE TABLET BY MOUTH EVERY DAY  Quantity: 30 unknown unit  Days Supply: 30  Refills: 0  Substitutions Allowed  Notes from Pharmacy:   ------------------------------------------

## 2022-02-16 ENCOUNTER — OFFICE VISIT - RIVER FALLS (OUTPATIENT)
Dept: FAMILY MEDICINE | Facility: CLINIC | Age: 67
End: 2022-02-16

## 2022-03-02 VITALS
HEART RATE: 80 BPM | DIASTOLIC BLOOD PRESSURE: 85 MMHG | WEIGHT: 271 LBS | SYSTOLIC BLOOD PRESSURE: 150 MMHG | BODY MASS INDEX: 35.75 KG/M2

## 2022-03-02 NOTE — TELEPHONE ENCOUNTER
---------------------  From: Nina AGUILAR, Stephany SARKAR (Phone Messages Pool ([address] [place] [address]))   To: [facility] ([address] [place] [address]);     Sent: 2/16/2022 2:30:04 PM CST  Subject: Pharmacy call     Ba from [facility] called  They do not have the HCTZ-Losartan combo med available. These would have to be ordered separately or a different med ordered.    Please advise.---------------------  From: Lissette Reno CMA ([facility] ([address] [place] [address]))   To: Samuel Zacarias MD;     Sent: 2/16/2022 3:10:59 PM CST  Subject: FW: Pharmacy call---------------------  From: Samuel Zacarias MD   To: [facility] ([address] [place] [address]);     Sent: 2/16/2022 3:20:06 PM CST  Subject: RE: Pharmacy call     ok separate---------------------  From: Lissette Reno CMA ([facility] Message [facility] ([address] [place] [address]))   To: Samuel Zacarias MD;     Sent: 2/16/2022 3:42:38 PM CST  Subject: RE: Pharmacy call     Do you want HCTZ 12.5mg (previously on) or 25mg?---------------------  From: Samuel Zacarias MD   To: [facility] ([address] [place] [address]);     Sent: 2/16/2022 3:57:57 PM CST  Subject: RE: Pharmacy call     25Done.
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
## 2022-05-29 ENCOUNTER — HEALTH MAINTENANCE LETTER (OUTPATIENT)
Age: 67
End: 2022-05-29

## 2022-08-09 ENCOUNTER — TELEPHONE (OUTPATIENT)
Dept: FAMILY MEDICINE | Facility: CLINIC | Age: 67
End: 2022-08-09

## 2022-08-09 DIAGNOSIS — E78.1 HYPERTRIGLYCERIDEMIA: Primary | ICD-10-CM

## 2022-08-09 DIAGNOSIS — I10 HTN (HYPERTENSION): ICD-10-CM

## 2022-08-09 RX ORDER — HYDROCHLOROTHIAZIDE 25 MG/1
25 TABLET ORAL DAILY
Qty: 90 TABLET | Refills: 0 | Status: SHIPPED | OUTPATIENT
Start: 2022-08-09 | End: 2022-08-26

## 2022-08-09 RX ORDER — LOSARTAN POTASSIUM 100 MG/1
100 TABLET ORAL DAILY
Qty: 90 TABLET | Refills: 0 | Status: SHIPPED | OUTPATIENT
Start: 2022-08-09 | End: 2022-08-26

## 2022-08-09 RX ORDER — AMLODIPINE BESYLATE 10 MG/1
10 TABLET ORAL DAILY
Qty: 90 TABLET | Refills: 0 | Status: SHIPPED | OUTPATIENT
Start: 2022-08-09 | End: 2022-08-26

## 2022-08-09 RX ORDER — ATORVASTATIN CALCIUM 20 MG/1
20 TABLET, FILM COATED ORAL DAILY
Qty: 90 TABLET | Refills: 0 | Status: SHIPPED | OUTPATIENT
Start: 2022-08-09 | End: 2022-08-26

## 2022-08-09 NOTE — TELEPHONE ENCOUNTER
Pt notified prescriptions have been refilled and to keep scheduled appt.   Prescription approved per [facility] Refill Protocol.  Pt has appt scheduled.  Last Written Prescription Date: 2/16/2  Last Fill Quantity: 90,  # refills: 1   Last office visit: 2/16/22

## 2022-08-09 NOTE — TELEPHONE ENCOUNTER
8/9/22    Medication Question or Refill        What medication are you calling about (include dose and sig)?: Atorvastatin 20 mg , hydrochlorothiazide 25 mg, amlodipine besylate 10 mg & losartan potassium 100 mg     Controlled Substance Agreement on file:   CSA -- Patient Level:    CSA: None found at the patient level.       Who prescribed the medication?: iris    Do you need a refill? Yes:  Pt will be running out on 8/18/22    When did you use the medication last? daily    Patient offered an appointment? No    Do you have any questions or concerns?  No    Preferred Pharmacy:   [facility] - [address] [address]  Phone: [phone] Fax: [phone]      Could we send this information to you in [email or web address] or would you prefer to receive a phone call?:   Patient would prefer a phone call   Okay to leave a detailed message?: Yes at Cell number on file:    Telephone Information:   Mobile [phone]

## 2022-08-26 ENCOUNTER — OFFICE VISIT (OUTPATIENT)
Dept: FAMILY MEDICINE | Facility: CLINIC | Age: 67
End: 2022-08-26
Payer: COMMERCIAL

## 2022-08-26 VITALS
DIASTOLIC BLOOD PRESSURE: 74 MMHG | SYSTOLIC BLOOD PRESSURE: 134 MMHG | HEIGHT: 73 IN | BODY MASS INDEX: 35.65 KG/M2 | HEART RATE: 68 BPM | WEIGHT: 269 LBS

## 2022-08-26 DIAGNOSIS — I10 PRIMARY HYPERTENSION: Primary | ICD-10-CM

## 2022-08-26 DIAGNOSIS — E78.1 HYPERTRIGLYCERIDEMIA: ICD-10-CM

## 2022-08-26 DIAGNOSIS — M54.50 CHRONIC LOW BACK PAIN, UNSPECIFIED BACK PAIN LATERALITY, UNSPECIFIED WHETHER SCIATICA PRESENT: ICD-10-CM

## 2022-08-26 DIAGNOSIS — G89.29 CHRONIC LOW BACK PAIN, UNSPECIFIED BACK PAIN LATERALITY, UNSPECIFIED WHETHER SCIATICA PRESENT: ICD-10-CM

## 2022-08-26 DIAGNOSIS — L30.9 ECZEMA, UNSPECIFIED TYPE: ICD-10-CM

## 2022-08-26 LAB
ALBUMIN SERPL BCG-MCNC: 4.6 G/DL (ref 3.5–5.2)
ALP SERPL-CCNC: 59 U/L (ref 40–129)
ALT SERPL W P-5'-P-CCNC: 58 U/L (ref 10–50)
ANION GAP SERPL CALCULATED.3IONS-SCNC: 10 MMOL/L (ref 7–15)
AST SERPL W P-5'-P-CCNC: 51 U/L (ref 10–50)
BASOPHILS # BLD AUTO: 0 10E3/UL (ref 0–0.2)
BASOPHILS NFR BLD AUTO: 1 %
BILIRUB SERPL-MCNC: 0.8 MG/DL
BUN SERPL-MCNC: 15 MG/DL (ref 8–23)
CALCIUM SERPL-MCNC: 9.3 MG/DL (ref 8.8–10.2)
CHLORIDE SERPL-SCNC: 100 MMOL/L (ref 98–107)
CHOLEST SERPL-MCNC: 131 MG/DL
CREAT SERPL-MCNC: 0.95 MG/DL (ref 0.67–1.17)
DEPRECATED HCO3 PLAS-SCNC: 30 MMOL/L (ref 22–29)
EOSINOPHIL # BLD AUTO: 0.2 10E3/UL (ref 0–0.7)
EOSINOPHIL NFR BLD AUTO: 3 %
ERYTHROCYTE [DISTWIDTH] IN BLOOD BY AUTOMATED COUNT: 11.8 % (ref 10–15)
GFR SERPL CREATININE-BSD FRML MDRD: 88 ML/MIN/1.73M2
GLUCOSE SERPL-MCNC: 116 MG/DL (ref 70–99)
HCT VFR BLD AUTO: 46.1 % (ref 40–53)
HDLC SERPL-MCNC: 57 MG/DL
HGB BLD-MCNC: 16 G/DL (ref 13.3–17.7)
IMM GRANULOCYTES # BLD: 0 10E3/UL
IMM GRANULOCYTES NFR BLD: 0 %
LDLC SERPL CALC-MCNC: 55 MG/DL
LYMPHOCYTES # BLD AUTO: 1.6 10E3/UL (ref 0.8–5.3)
LYMPHOCYTES NFR BLD AUTO: 31 %
MCH RBC QN AUTO: 33.9 PG (ref 26.5–33)
MCHC RBC AUTO-ENTMCNC: 34.7 G/DL (ref 31.5–36.5)
MCV RBC AUTO: 98 FL (ref 78–100)
MONOCYTES # BLD AUTO: 0.6 10E3/UL (ref 0–1.3)
MONOCYTES NFR BLD AUTO: 12 %
NEUTROPHILS # BLD AUTO: 2.7 10E3/UL (ref 1.6–8.3)
NEUTROPHILS NFR BLD AUTO: 53 %
NONHDLC SERPL-MCNC: 74 MG/DL
PLATELET # BLD AUTO: 212 10E3/UL (ref 150–450)
POTASSIUM SERPL-SCNC: 3.9 MMOL/L (ref 3.4–5.3)
PROT SERPL-MCNC: 7.5 G/DL (ref 6.4–8.3)
RBC # BLD AUTO: 4.72 10E6/UL (ref 4.4–5.9)
SODIUM SERPL-SCNC: 140 MMOL/L (ref 136–145)
TRIGL SERPL-MCNC: 97 MG/DL
WBC # BLD AUTO: 5.1 10E3/UL (ref 4–11)

## 2022-08-26 PROCEDURE — 99214 OFFICE O/P EST MOD 30 MIN: CPT | Performed by: FAMILY MEDICINE

## 2022-08-26 PROCEDURE — 36415 COLL VENOUS BLD VENIPUNCTURE: CPT | Performed by: FAMILY MEDICINE

## 2022-08-26 PROCEDURE — 80061 LIPID PANEL: CPT | Performed by: FAMILY MEDICINE

## 2022-08-26 PROCEDURE — 85025 COMPLETE CBC W/AUTO DIFF WBC: CPT | Mod: QW | Performed by: FAMILY MEDICINE

## 2022-08-26 PROCEDURE — 80053 COMPREHEN METABOLIC PANEL: CPT | Performed by: FAMILY MEDICINE

## 2022-08-26 RX ORDER — BETAMETHASONE DIPROPIONATE 0.5 MG/G
CREAM TOPICAL 2 TIMES DAILY
Qty: 30 G | Refills: 2 | Status: SHIPPED | OUTPATIENT
Start: 2022-08-26 | End: 2022-09-25

## 2022-08-26 RX ORDER — ATORVASTATIN CALCIUM 20 MG/1
20 TABLET, FILM COATED ORAL DAILY
Qty: 90 TABLET | Refills: 1 | Status: SHIPPED | OUTPATIENT
Start: 2022-08-26 | End: 2023-03-31

## 2022-08-26 RX ORDER — CYCLOBENZAPRINE HCL 10 MG
10 TABLET ORAL DAILY PRN
COMMUNITY
Start: 2022-08-15 | End: 2022-08-26

## 2022-08-26 RX ORDER — HYDROCHLOROTHIAZIDE 25 MG/1
25 TABLET ORAL DAILY
Qty: 90 TABLET | Refills: 1 | Status: SHIPPED | OUTPATIENT
Start: 2022-08-26 | End: 2023-03-31

## 2022-08-26 RX ORDER — AMLODIPINE BESYLATE 10 MG/1
10 TABLET ORAL DAILY
Qty: 90 TABLET | Refills: 1 | Status: SHIPPED | OUTPATIENT
Start: 2022-08-26 | End: 2023-03-31

## 2022-08-26 RX ORDER — CYCLOBENZAPRINE HCL 10 MG
10 TABLET ORAL 2 TIMES DAILY PRN
Qty: 30 TABLET | Refills: 11 | Status: SHIPPED | OUTPATIENT
Start: 2022-08-26 | End: 2023-11-08

## 2022-08-26 RX ORDER — LOSARTAN POTASSIUM 100 MG/1
100 TABLET ORAL DAILY
Qty: 90 TABLET | Refills: 1 | Status: SHIPPED | OUTPATIENT
Start: 2022-08-26 | End: 2023-03-31

## 2022-08-26 NOTE — LETTER
August 27, 2022      Armand Crystal   [facility] [address]        Dear ,    We are writing to inform you of your test results.    Your test results fall within the expected range(s) or remain unchanged from previous results.  Please continue with current treatment plan.    Resulted Orders   Comprehensive metabolic panel   Result Value Ref Range    Sodium 140 136 - 145 mmol/L    Potassium 3.9 3.4 - 5.3 mmol/L    Creatinine 0.95 0.67 - 1.17 mg/dL    Urea Nitrogen 15.0 8.0 - 23.0 mg/dL    Chloride 100 98 - 107 mmol/L    Carbon Dioxide (CO2) 30 (H) 22 - 29 mmol/L    Anion Gap 10 7 - 15 mmol/L    Glucose 116 (H) 70 - 99 mg/dL    Calcium 9.3 8.8 - 10.2 mg/dL    Protein Total 7.5 6.4 - 8.3 g/dL    Albumin 4.6 3.5 - 5.2 g/dL    Bilirubin Total 0.8 <=1.2 mg/dL    Alkaline Phosphatase 59 40 - 129 U/L    AST 51 (H) 10 - 50 U/L    ALT 58 (H) 10 - 50 U/L    GFR Estimate 88 >60 mL/min/1.73m2      Comment:      Effective December 21, 2021 eGFRcr in adults is calculated using the 2021 CKD-EPI creatinine equation which includes age and gender (Sonido et al., NEJM, DOI: 10.1056/YTQHlx9446493)   Lipid panel reflex to direct LDL Fasting   Result Value Ref Range    Cholesterol 131 <200 mg/dL    Triglycerides 97 <150 mg/dL    Direct Measure HDL 57 >=40 mg/dL    LDL Cholesterol Calculated 55 <=100 mg/dL    Non HDL Cholesterol 74 <130 mg/dL    Narrative    Cholesterol  Desirable:  <200 mg/dL    Triglycerides  Normal:  Less than 150 mg/dL  Borderline High:  150-199 mg/dL  High:  200-499 mg/dL  Very High:  Greater than or equal to 500 mg/dL    Direct Measure HDL  Female:  Greater than or equal to 50 mg/dL   Male:  Greater than or equal to 40 mg/dL    LDL Cholesterol  Desirable:  <100mg/dL  Above Desirable:  100-129 mg/dL   Borderline High:  130-159 mg/dL   High:  160-189 mg/dL   Very High:  >= 190 mg/dL    Non HDL Cholesterol  Desirable:  130 mg/dL  Above Desirable:  130-159 mg/dL  Borderline High:  160-189  mg/dL  High:  190-219 mg/dL  Very High:  Greater than or equal to 220 mg/dL   CBC with platelets and differential   Result Value Ref Range    WBC Count 5.1 4.0 - 11.0 10e3/uL    RBC Count 4.72 4.40 - 5.90 10e6/uL    Hemoglobin 16.0 13.3 - 17.7 g/dL    Hematocrit 46.1 40.0 - 53.0 %    MCV 98 78 - 100 fL    MCH 33.9 (H) 26.5 - 33.0 pg    MCHC 34.7 31.5 - 36.5 g/dL    RDW 11.8 10.0 - 15.0 %    Platelet Count 212 150 - 450 10e3/uL    % Neutrophils 53 %    % Lymphocytes 31 %    % Monocytes 12 %    % Eosinophils 3 %    % Basophils 1 %    % Immature Granulocytes 0 %    Absolute Neutrophils 2.7 1.6 - 8.3 10e3/uL    Absolute Lymphocytes 1.6 0.8 - 5.3 10e3/uL    Absolute Monocytes 0.6 0.0 - 1.3 10e3/uL    Absolute Eosinophils 0.2 0.0 - 0.7 10e3/uL    Absolute Basophils 0.0 0.0 - 0.2 10e3/uL    Absolute Immature Granulocytes 0.0 <=0.4 10e3/uL       If you have any questions or concerns, please call the clinic at the number listed above.       Sincerely,      Samuel Zacarias MD

## 2022-10-03 ENCOUNTER — HEALTH MAINTENANCE LETTER (OUTPATIENT)
Age: 67
End: 2022-10-03

## 2023-01-10 NOTE — PROGRESS NOTES
"  Assessment & Plan     Primary hypertension  Under good control with below medications no changes  - CBC with Platelets & Differential; Future  - Comprehensive metabolic panel; Future  - amLODIPine (NORVASC) 10 MG tablet; Take 1 tablet (10 mg) by mouth daily  - hydrochlorothiazide (HYDRODIURIL) 25 MG tablet; Take 1 tablet (25 mg) by mouth daily  - losartan (COZAAR) 100 MG tablet; Take 1 tablet (100 mg) by mouth daily    Hypertriglyceridemia  Under good control  - Lipid panel reflex to direct LDL Fasting; Future  - atorvastatin (LIPITOR) 20 MG tablet; Take 1 tablet (20 mg) by mouth daily    Chronic low back pain, unspecified back pain laterality, unspecified whether sciatica present  Chronic uses rare Flexeril  - cyclobenzaprine (FLEXERIL) 10 MG tablet; Take 1 tablet (10 mg) by mouth 2 times daily as needed for muscle spasms    Eczema, unspecified type  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    Weight as of this encounter: 122 kg (269 lb).           Return in about 6 months (around 2/26/2023) for Follow up.    Samuel Zacarias MD  [facility]    Dayami Acuna is a 67 year old, presenting for the following health issues: Overall doing well continues to be very active at home had a recent fall but injuries are healing.  Medications as directed  Hypertension      History of Present Illness       Reason for visit:  Med refill    He eats 2-3 servings of fruits and vegetables daily.He consumes 0 sweetened beverage(s) daily.He exercises with enough effort to increase his heart rate 9 or less minutes per day.  He exercises with enough " L/M to schedule appt "effort to increase his heart rate 3 or less days per week.   He is taking medications regularly.     Does not check bp at home.         Review of Systems   Constitutional, HEENT, cardiovascular, pulmonary, gi and gu systems are negative, except as otherwise noted.      Objective    /74 (BP Location: Right arm, Patient Position: Sitting, Cuff Size: Adult Large)   Pulse 68   Ht 1.854 m (6' 1\")   Wt 122 kg (269 lb)   BMI 35.49 kg/m    Body mass index is 35.49 kg/m .  Physical Exam   GENERAL: healthy, alert and no distress  NECK: no adenopathy, no asymmetry, masses, or scars and thyroid normal to palpation  RESP: lungs clear to auscultation - no rales, rhonchi or wheezes  CV: regular rate and rhythm, normal S1 S2, no S3 or S4, no murmur, click or rub, no peripheral edema and peripheral pulses strong  ABDOMEN: soft, nontender, no hepatosplenomegaly, no masses and bowel sounds normal  MS: no gross musculoskeletal defects noted, no edema  Eczema lower back  Office Visit - [place] on 08/20/2021   Component Date Value Ref Range Status     Potassium 08/20/2021 3.9  3.5 - 5.3 mmol/L Final    Comment: Lab test performed by:  Lab Mnemonic:MICHELE  [facility] [address]  Jadiel Moreira M.D.  QUEST Specimen received date and time: 21-AUG-2021 02:41:00.00       BUN/Creatinine Ratio - Historical 08/20/2021 NOT APPLICABLE  6 - 22 Final    Comment: Lab test performed by:  Lab Mnemonic:MICHELE  [facility] [address]  Jadiel Moreira M.D.  Unit of Measure:   (calc)  QUEST Specimen received date and time: 21-AUG-2021 02:41:00.00       Sodium 08/20/2021 138  135 - 146 mmol/L Final    Comment: Lab test performed by:  Lab Mnemonic:MICHELE  [facility]  [address] [place] [address]  Jadiel Moreira M.D.  QUEST Specimen received date and time: 21-AUG-2021 02:41:00.00       Carbon Dioxide (CO2) 08/20/2021 28  " 20 - 32 mmol/L Final    Comment: Lab test performed by:  Lab Mnemonic:  [facility] [place] [address]  Jadiel Moerira M.D.  QUEST Specimen received date and time: 21-AUG-2021 02:41:00.00       Calcium 08/20/2021 9.4  8.6 - 10.3 mg/dL Final    Comment: Lab test performed by:  Lab Mnemonic:  [facility]  [facility] [place] [address]  Jadiel Moreira M.D.  QUEST Specimen received date and time: 21-AUG-2021 02:41:00.00       Creatinine 08/20/2021 0.95  0.70 - 1.25 mg/dL Final    Comment: For patients >49 years of age, the reference limit  for Creatinine is approximately 13% higher for people  identified as -American.     Lab test performed by:  Lab Mnemonic:  [facility]  [facility] [place] [address]  Jadiel Moreira M.D.  QUEST Specimen received date and time: 21-AUG-2021 02:41:00.00       Chloride 08/20/2021 103  98 - 110 mmol/L Final    Comment: Lab test performed by:  Lab Mnemonic:  [facility]  [facility] [place] [address]  Jadiel Moreira M.D.  QUEST Specimen received date and time: 21-AUG-2021 02:41:00.00       Glucose 08/20/2021 115 (A) 65 - 99 mg/dL Final    Comment:               Fasting reference interval     For someone without known diabetes, a glucose value  between 100 and 125 mg/dL is consistent with  prediabetes and should be confirmed with a  follow-up test.     Lab test performed by:  Lab Mnemonic:  [facility]  [facility] [place] [address]  Jadiel Moreira M.D.  QUEST Specimen received date and time: 21-AUG-2021 02:41:00.00       Urea Nitrogen 08/20/2021 14  7 - 25 mg/dL Final    Comment: Lab test performed by:  Lab Mnemonic:  [facility] [place] [address]  Jadiel Moreira M.D.  QUEST Specimen received date and time: 21-AUG-2021 02:41:00.00       LOC Funes  08/20/2021 83  > OR = 60 mL/min/1.73m2 Final    Comment: Lab test performed by:  Lab Mnemonic:  [email or web address]  [address] [place] [address]  Jadiel Moreira M.D.  QUEST Specimen received date and time: 21-AUG-2021 02:41:00.00       Direct Measure HDL 08/20/2021 50  > OR = 40 mg/dL Final    Comment: Lab test performed by:  Lab Mnemonic:  [email or web address]  [facility] [place] [address]  Jadiel Moreira M.D.  QUEST Specimen received date and time: 21-AUG-2021 02:41:00.00       Non HDL Cholesterol 08/20/2021 69  <130 Final    Comment: For patients with diabetes plus 1 major ASCVD risk   factor, treating to a non-HDL-C goal of <100 mg/dL   (LDL-C of <70 mg/dL) is considered a therapeutic   option.  Lab test performed by:  Lab Mnemonic:MICHELE  [email or web address]  [facility] [place] [address]  Jadiel oMreira M.D.  Unit of Measure:   mg/dL (calc)  QUEST Specimen received date and time: 21-AUG-2021 02:41:00.00       Cholesterol 08/20/2021 119  <200 mg/dL Final    Comment: Lab test performed by:  Lab Mnemonic:  [email or web address]  [facility] [place] [address]  Jadiel Moreira M.D.  QUEST Specimen received date and time: 21-AUG-2021 02:41:00.00       Triglycerides 08/20/2021 94  <150 mg/dL Final    Comment: Lab test performed by:  Lab Mnemonic:  [email or web address]  [facility] [place] [address]  Jadiel Moreira M.D.  QUEST Specimen received date and time: 21-AUG-2021 02:41:00.00       Cholesterol/HDL Ratio 08/20/2021 2.4  <5.0 Final    Comment: Lab test performed by:  Lab Mnemonic:MICHELE  [email or web address]  [address] [place] [address]  Jadiel Moreira M.D.  Unit of Measure:   (calc)  QUEST Specimen received date and time: 21-AUG-2021 02:41:00.00       LDL Cholesterol Calculated 08/20/2021 51   Final    Comment: Reference range: <100     Desirable range <100 mg/dL for  primary prevention;    <70 mg/dL for patients with CHD or diabetic patients   with > or = 2 CHD risk factors.     LDL-C is now calculated using the Jaylene   calculation, which is a validated novel method providing   better accuracy than the Friedewald equation in the   estimation of LDL-C.   Abelino CUETO et al. CANDELARIO. 2013;310(19): 3810-5469   (http://education.Crowdability.ColorModules/faq/HZP506)  Lab test performed by:  Lab Mnemonic:MICHELE  [facility]  [address] [place] [address]  Jadiel Moreira M.D.  Unit of Measure:   mg/dL (calc)  QUEST Specimen received date and time: 21-AUG-2021 02:41:00.00                       .  ..

## 2023-03-31 ENCOUNTER — TELEPHONE (OUTPATIENT)
Dept: FAMILY MEDICINE | Facility: CLINIC | Age: 68
End: 2023-03-31

## 2023-03-31 ENCOUNTER — OFFICE VISIT (OUTPATIENT)
Dept: FAMILY MEDICINE | Facility: CLINIC | Age: 68
End: 2023-03-31
Payer: COMMERCIAL

## 2023-03-31 ENCOUNTER — ANCILLARY PROCEDURE (OUTPATIENT)
Dept: GENERAL RADIOLOGY | Facility: CLINIC | Age: 68
End: 2023-03-31
Attending: FAMILY MEDICINE
Payer: COMMERCIAL

## 2023-03-31 VITALS
SYSTOLIC BLOOD PRESSURE: 138 MMHG | BODY MASS INDEX: 35.89 KG/M2 | DIASTOLIC BLOOD PRESSURE: 82 MMHG | WEIGHT: 272 LBS | HEART RATE: 72 BPM | TEMPERATURE: 98.3 F

## 2023-03-31 DIAGNOSIS — M79.641 PAIN OF RIGHT HAND: Primary | ICD-10-CM

## 2023-03-31 DIAGNOSIS — E66.01 MORBID OBESITY (H): ICD-10-CM

## 2023-03-31 DIAGNOSIS — I10 PRIMARY HYPERTENSION: ICD-10-CM

## 2023-03-31 DIAGNOSIS — E78.1 HYPERTRIGLYCERIDEMIA: ICD-10-CM

## 2023-03-31 DIAGNOSIS — Z12.11 COLON CANCER SCREENING: ICD-10-CM

## 2023-03-31 DIAGNOSIS — M75.22 BICIPITAL TENDINITIS OF LEFT SHOULDER: ICD-10-CM

## 2023-03-31 DIAGNOSIS — H81.13 BENIGN PAROXYSMAL POSITIONAL VERTIGO, BILATERAL: ICD-10-CM

## 2023-03-31 DIAGNOSIS — M79.641 PAIN OF RIGHT HAND: ICD-10-CM

## 2023-03-31 DIAGNOSIS — R20.2 PARESTHESIA OF LEFT FOOT: ICD-10-CM

## 2023-03-31 PROCEDURE — 73130 X-RAY EXAM OF HAND: CPT | Mod: TC | Performed by: RADIOLOGY

## 2023-03-31 PROCEDURE — 99214 OFFICE O/P EST MOD 30 MIN: CPT | Performed by: FAMILY MEDICINE

## 2023-03-31 RX ORDER — ATORVASTATIN CALCIUM 20 MG/1
20 TABLET, FILM COATED ORAL DAILY
Qty: 90 TABLET | Refills: 1 | Status: SHIPPED | OUTPATIENT
Start: 2023-03-31 | End: 2023-11-08

## 2023-03-31 RX ORDER — HYDROCHLOROTHIAZIDE 25 MG/1
25 TABLET ORAL DAILY
Qty: 90 TABLET | Refills: 1 | Status: SHIPPED | OUTPATIENT
Start: 2023-03-31 | End: 2023-11-08

## 2023-03-31 RX ORDER — AMLODIPINE BESYLATE 10 MG/1
10 TABLET ORAL DAILY
Qty: 90 TABLET | Refills: 1 | Status: SHIPPED | OUTPATIENT
Start: 2023-03-31 | End: 2023-11-08

## 2023-03-31 RX ORDER — LOSARTAN POTASSIUM 100 MG/1
100 TABLET ORAL DAILY
Qty: 90 TABLET | Refills: 1 | Status: SHIPPED | OUTPATIENT
Start: 2023-03-31 | End: 2023-11-08

## 2023-03-31 NOTE — PROGRESS NOTES
"  Assessment & Plan     Pain of right hand  Likely osteoarthritis involving several of the MCPs given the history of trauma last year concern whether or not there may be something more going on x-rays are negative we will get orthopedic hand consult  - XR Hand Right G/E 3 Views; Future  - Orthopedic  Referral; Future    Benign paroxysmal positional vertigo, bilateral  Reassurance avoid rapid position change    Bicipital tendinitis of left shoulder  Symptoms started with injuries from throwing 2 weeks ago if is not back to normal for weeks consider advanced imaging and PT    Paresthesia of left foot  Positional reassurance    Primary hypertension  Under reasonable control  - amLODIPine (NORVASC) 10 MG tablet; Take 1 tablet (10 mg) by mouth daily  - hydrochlorothiazide (HYDRODIURIL) 25 MG tablet; Take 1 tablet (25 mg) by mouth daily  - losartan (COZAAR) 100 MG tablet; Take 1 tablet (100 mg) by mouth daily    Hypertriglyceridemia    - atorvastatin (LIPITOR) 20 MG tablet; Take 1 tablet (20 mg) by mouth daily    Colon cancer screening  Due for Cologuard  - COLOGUARD(EXACT SCIENCES)    Morbid obesity (H)  Very interested in Ozempic we will refer to dietary  - Adult Comprehensive Weight Management  Referral; Future             BMI:   Estimated body mass index is 35.89 kg/m  as calculated from the following:    Height as of 8/26/22: 1.854 m (6' 1\").    Weight as of this encounter: 123.4 kg (272 lb).           Samuel Zacarias MD  [facility]    Dayami Acuna is a 67 year old, presenting for the following health issues:  Dizziness (Comes and goes. Notices with positional changes. ), Musculoskeletal Problem (L shoulder - re injured about 1.5 weeks ago. R hand pain - fell on it in August. ), Weight Loss (medication), Foot Problems (L foot goes numb at times. Did almost fall when stepping. ), and Hypertension (refills)    Additional Questions 3/31/2023   Roomed by Rowdy "     History of Present Illness       Reason for visit:  Hand  Symptom onset:  1-2 weeks ago  Symptoms include:  Shoulder  Symptom intensity:  Severe  Symptom progression:  Staying the same  Had these symptoms before:  Yes  Has tried/received treatment for these symptoms:  No  What makes it worse:  Lifting  What makes it better:  No    He eats 0-1 servings of fruits and vegetables daily.He consumes 0 sweetened beverage(s) daily.He exercises with enough effort to increase his heart rate 9 or less minutes per day.  He exercises with enough effort to increase his heart rate 3 or less days per week.   He is taking medications regularly.       Does check BP at home with readings over 140/90. Believes machine is not correct.        There is no problem list on file for this patient.    Current Outpatient Medications   Medication     amLODIPine (NORVASC) 10 MG tablet     Aspirin 81 MG CAPS     atorvastatin (LIPITOR) 20 MG tablet     cyclobenzaprine (FLEXERIL) 10 MG tablet     hydrochlorothiazide (HYDRODIURIL) 25 MG tablet     losartan (COZAAR) 100 MG tablet     No current facility-administered medications for this visit.           Review of Systems   Constitutional, HEENT, cardiovascular, pulmonary, gi and gu systems are negative, except as otherwise noted.      Objective    /82 (BP Location: Right arm, Patient Position: Sitting, Cuff Size: Adult Large)   Pulse 72   Temp 98.3  F (36.8  C) (Temporal)   Wt 123.4 kg (272 lb)   BMI 35.89 kg/m    Body mass index is 35.89 kg/m .  Physical Exam   Patient alert no apparent distress eyes reveal pupils equal round react light extraocular movements were intact no nystagmus seen neurologic exam reveals cranial nerves to be intact moves his extremities equally symmetrically.  Left shoulder reveals slow but full range of motion.  Tenderness over his bicipital tendon anteriorly.  Weakness because of pain.  Otherwise left upper extremity CMS intact.  Right hand today reveals some  swelling and tenderness noted of the second third and fourth MCPs.  Some decreased range of motion otherwise CMS intact left foot exam today was normal    Xray - Reviewed and interpreted by me.  No acute changes

## 2023-03-31 NOTE — TELEPHONE ENCOUNTER
----- Message from Samuel Zacarias MD sent at 3/31/2023  1:37 PM CDT -----  Let him know that his hand x-ray looks okay to my eye I put a referral in for him to see orthopedic hand specialist regarding his right hand issues.

## 2023-03-31 NOTE — LETTER
May 16, 2023      Armand Crystal   [facility] [address]        Dear ,    We are writing to inform you of your test results.    Your test results fall within the expected range(s) or remain unchanged from previous results.  Please continue with current treatment plan.    Resulted Orders   COLOGUARD(EXACT SCIENCES)   Result Value Ref Range    COLOGUARD-ABSTRACT Sample Could Not Be Processed 3 N/A      Comment:      The stool exceeds the allowable weight (300 grams). The patient will be contacted to initiate a new sample collection.   COLOGUARD(EXACT SCIENCES)   Result Value Ref Range    COLOGUARD-ABSTRACT Negative Negative      Comment:        NEGATIVE TEST RESULT. A negative Cologuard result indicates a low likelihood that a colorectal cancer (CRC) or advanced adenoma (adenomatous polyps with more advanced pre-malignant features)  is present. The chance that a person with a negative Cologuard test has a colorectal cancer is less than 1 in 1500 (negative predictive value >99.9%) or has an  advanced adenoma is less than  5.3% (negative predictive value 94.7%). These data are based on a prospective cross-sectional study of 10,000 individuals at average risk for colorectal cancer who were screened with both Cologuard and colonoscopy. (Rosibel CARLIN. et al, N Engl J Med 2014;370(14):5133-4842) The normal value (reference range) for this assay is negative.    COLOGUARD RE-SCREENING RECOMMENDATION: Periodic colorectal cancer screening is an important part of preventive healthcare for asymptomatic individuals at average risk for colorectal cancer.  Following a negative Cologuard result, the American Cancer Society and U.S.  Multi-Society Task Force screening guidelines recommend a Cologuard re-screening interval of 3 years.   References: American Cancer Society Guideline for Colorectal Cancer Screening:  https://www.cancer.org/cancer/colon-rectal-cancer/kzcfrebxy-ygvmjndef-byckbvu/acs-recommendations.html.; Regis DK, Tierra CR, Tad AIKENK, Colorectal Cancer Screening: Recommendations for Physicians and Patients from the U.S. Multi-Society Task Force on Colorectal Cancer Screening , Am J Gastroenterology 2017; 112:5081-4017.    TEST DESCRIPTION: Composite algorithmic analysis of stool DNA-biomarkers with hemoglobin immunoassay.   Quantitative values of individual biomarkers are not reportable and are not associated with individual biomarker result reference ranges. Cologuard is intended for colorectal cancer screening of adults of either sex, 45 years or older, who are at average-risk for colorectal cancer (CRC). Cologuard has been approved for use by the U.S. FDA. The performance of Cologuard was  established in a cross sectional study of average-risk adults aged 50-84. Cologuard performance in patients ages 45 to 49 years was estimated by sub-group analysis of near-age groups. Colonoscopies performed for a positive result may find as the most clinically significant lesion: colorectal cancer [4.0%], advanced adenoma (including sessile serrated polyps greater than or equal to 1cm diameter) [20%] or non- advanced adenoma [31%]; or no colorectal neoplasia [45%]. These estimates are derived from a prospective cross-sectional screening study of 10,000 individuals at average risk for colorectal cancer who were screened with both Cologuard and colonoscopy. (Rosibel Higgins al, N Engl J Med 2014;370(14):2389-5448.) Cologuard may produce a false negative or false positive result (no colorectal cancer or precancerous polyp present at colonoscopy follow up). A negative Cologuard test result does not guarantee the absence of CRC or advanced adenoma (pre-cancer). The current Cologuard  screening interval is every 3 years. (American Cancer Society and U.S. Multi-Society Task Force). Cologuard performance data in a 10,000 patient  pivotal study using colonoscopy as the reference method can be accessed at the following location: [email or web address]. Additional description of the Cologuard test process, warnings and precautions can be found at [email or web address].         If you have any questions or concerns, please call the clinic at the number listed above.       Sincerely,      Samuel Zacarias MD

## 2023-04-05 ENCOUNTER — TRANSFERRED RECORDS (OUTPATIENT)
Dept: HEALTH INFORMATION MANAGEMENT | Facility: CLINIC | Age: 68
End: 2023-04-05
Payer: COMMERCIAL

## 2023-04-10 ENCOUNTER — OFFICE VISIT (OUTPATIENT)
Dept: EDUCATION SERVICES | Facility: CLINIC | Age: 68
End: 2023-04-10
Payer: COMMERCIAL

## 2023-04-10 VITALS — HEIGHT: 73 IN | BODY MASS INDEX: 35.88 KG/M2 | WEIGHT: 270.7 LBS

## 2023-04-10 DIAGNOSIS — E66.9 OBESITY: Primary | ICD-10-CM

## 2023-04-10 PROCEDURE — 97802 MEDICAL NUTRITION INDIV IN: CPT | Mod: GA | Performed by: DIETITIAN, REGISTERED

## 2023-04-10 RX ORDER — SEMAGLUTIDE 0.68 MG/ML
INJECTION, SOLUTION SUBCUTANEOUS
Qty: 3 ML | Refills: 0 | Status: SHIPPED | OUTPATIENT
Start: 2023-04-10 | End: 2023-05-15

## 2023-04-10 NOTE — LETTER
4/10/2023         RE: Rolando Crystal   [facility] [id number]        Dear Colleague,    Thank you for referring your patient, Rolando Crystal, to the [facility]. Please see a copy of my visit note below.    Medical Nutrition Therapy  Visit Type:Initial assessment and intervention    Rolando Crystal presents today for MNT and education related to obesity Body mass index is 35.71 kg/m ..   He is accompanied by spouse.     ASSESSMENT:   Patient comments/concerns relating to nutrition: Patient has been working on diet to help promote weight loss.  Patient is consuming less starches if any trying to make them whole-grain.  Increased vegetable intake and choosing lean less processed meats primarily chicken, pork and fish with less red meats.  Patient is interested in calorie recommendation and medication that will promote weight loss.  Patient's daughter is on Ozempic and would like to try that first.    NUTRITION HISTORY:  Less pasta, potato   Likes chicken, fish,   More salads     Water, beer light    Misses meals? No   Eats out:  1 meals/per week     Previous diet education:  No     Food allergies/intolerances: none  EXERCISE: no regular exercise program    SOCIO/ECONOMIC:   Lives with: spouse    MEDICATIONS:  Current Outpatient Medications   Medication     semaglutide (OZEMPIC, 0.25 OR 0.5 MG/DOSE,) 2 MG/3ML SOPN pen     amLODIPine (NORVASC) 10 MG tablet     Aspirin 81 MG CAPS     atorvastatin (LIPITOR) 20 MG tablet     cyclobenzaprine (FLEXERIL) 10 MG tablet     hydrochlorothiazide (HYDRODIURIL) 25 MG tablet     losartan (COZAAR) 100 MG tablet     No current facility-administered medications for this visit.       LABS:  Last Basic Metabolic Panel:  Lab Results   Component Value Date     08/26/2022      Lab Results   Component Value Date    POTASSIUM 3.9 08/26/2022    POTASSIUM 3.9 08/20/2021     Lab Results   Component Value Date    CHLORIDE 100  "08/26/2022    CHLORIDE 103 08/20/2021     Lab Results   Component Value Date    JULISSA 9.3 08/26/2022     Lab Results   Component Value Date    CO2 30 08/26/2022    CO2 28 08/20/2021     Lab Results   Component Value Date    BUN 15.0 08/26/2022    BUN 14 08/20/2021     Lab Results   Component Value Date    CR 0.95 08/26/2022     Lab Results   Component Value Date     08/26/2022     08/20/2021       ANTHROPOMETRICS:  Vitals: Ht 1.854 m (6' 1\")   Wt 122.8 kg (270 lb 11.2 oz)   BMI 35.71 kg/m    Body mass index is 35.71 kg/m .      Wt Readings from Last 5 Encounters:   04/10/23 122.8 kg (270 lb 11.2 oz)   03/31/23 123.4 kg (272 lb)   08/26/22 122 kg (269 lb)   02/16/22 122.9 kg (271 lb)   08/20/21 120 kg (264 lb 8 oz)       ESTIMATED KCAL REQUIREMENTS:  1800 kcal per day  NUTRITION DIAGNOSIS: Overweight/Obesity related to increased calorie intake as evidenced by Body mass index is 35.71 kg/m .     NUTRITION INTERVENTION:  Nutrition Prescription: Energy Intake: 1800 kcal/day, carbohydrate controlled max 30 g carbohydrate meals  Education given to support: general nutrition guidelines, prevention of prediabetes, weight reduction, exercise and portion control  Education Materials Provided: My Plate Planner/Choose My Plate   Discussed what dietary changes has worked well in the past and how to incorporate them again.  Discussion on healthy behavior changes that can promote calorie reduction in diet.    We discussed why it is important to incorporate healthy lifestyle interventions to control overall health to prevent complications of being obese including the potential of developing diabetes and preventing heart disease.      Hunger/ Fullness cues - help identify how pt is feeling before, during, and after eating   Smart Snacking and 20 Ways to eat more fruit and vegetables.    Mindful eating - listening to body vs eating out of stress, boredom, emotion, eating to fuel body for strength and energy   Tracking " food - balance of meals and snacks   Importance of daily physical activity as able to promote endorphin release       reduce stress, anxiety, and depression, improve sleep, increase energy level, improve muscle tone and strength ...   Recommend: Discussed options for medications and reduced calorie dietary guidelines.    Ozempic   Weekly injection   Week 1-2  0.25mg  Week 3 - 5 0.50mg    Will need a new pen Rx delivers only 1.0 mg   Goal dose 1.0 mg    New Pen delivers only 2.0 mg (potential to increase in the future)  Goal dose 2.0 mg      OR if insurance does not cover Ozempic     Wegovy: proper use, mechanism of action, indications, dosing, injection schedule, safety and side effects.  Pt is shown a demonstration of the use of the pen and was able to return demonstration without difficulty.   Dose Escalation Schedule  Weeks                             Weekly Dose  1 through 4                      0.25 mg  5 through 8                      0.5 mg  9 through 12                    1 mg  13 through 16                  1.7 mg  Week 17 and onward      2.4 mg Maintenance dose    PATIENT'S BEHAVIOR CHANGE GOALS:   See Patient Instructions for patient stated behavior change goals. AVS was printed and given to patient at today's appointment.    MONITOR / EVALUATE:  RD will monitor/evaluate:  Blood Glucose / A1c  Pertinent Labs  Weight change    FOLLOW-UP:  Follow up with RD as needed.    Time spent in minutes: 15  Encounter: Individual

## 2023-04-10 NOTE — PROGRESS NOTES
Medical Nutrition Therapy  Visit Type:Initial assessment and intervention    Rolando Crystal presents today for MNT and education related to obesity Body mass index is 35.71 kg/m ..   He is accompanied by spouse.     ASSESSMENT:   Patient comments/concerns relating to nutrition: Patient has been working on diet to help promote weight loss.  Patient is consuming less starches if any trying to make them whole-grain.  Increased vegetable intake and choosing lean less processed meats primarily chicken, pork and fish with less red meats.  Patient is interested in calorie recommendation and medication that will promote weight loss.  Patient's daughter is on Ozempic and would like to try that first.    NUTRITION HISTORY:  Less pasta, potato   Likes chicken, fish,   More salads     Water, beer light    Misses meals? No   Eats out:  1 meals/per week     Previous diet education:  No     Food allergies/intolerances: none  EXERCISE: no regular exercise program    SOCIO/ECONOMIC:   Lives with: spouse    MEDICATIONS:  Current Outpatient Medications   Medication     semaglutide (OZEMPIC, 0.25 OR 0.5 MG/DOSE,) 2 MG/3ML SOPN pen     amLODIPine (NORVASC) 10 MG tablet     Aspirin 81 MG CAPS     atorvastatin (LIPITOR) 20 MG tablet     cyclobenzaprine (FLEXERIL) 10 MG tablet     hydrochlorothiazide (HYDRODIURIL) 25 MG tablet     losartan (COZAAR) 100 MG tablet     No current facility-administered medications for this visit.       LABS:  Last Basic Metabolic Panel:  Lab Results   Component Value Date     08/26/2022      Lab Results   Component Value Date    POTASSIUM 3.9 08/26/2022    POTASSIUM 3.9 08/20/2021     Lab Results   Component Value Date    CHLORIDE 100 08/26/2022    CHLORIDE 103 08/20/2021     Lab Results   Component Value Date    JULISSA 9.3 08/26/2022     Lab Results   Component Value Date    CO2 30 08/26/2022    CO2 28 08/20/2021     Lab Results   Component Value Date    BUN 15.0 08/26/2022    BUN 14 08/20/2021     Lab  "Results   Component Value Date    CR 0.95 08/26/2022     Lab Results   Component Value Date     08/26/2022     08/20/2021       ANTHROPOMETRICS:  Vitals: Ht 1.854 m (6' 1\")   Wt 122.8 kg (270 lb 11.2 oz)   BMI 35.71 kg/m    Body mass index is 35.71 kg/m .      Wt Readings from Last 5 Encounters:   04/10/23 122.8 kg (270 lb 11.2 oz)   03/31/23 123.4 kg (272 lb)   08/26/22 122 kg (269 lb)   02/16/22 122.9 kg (271 lb)   08/20/21 120 kg (264 lb 8 oz)       ESTIMATED KCAL REQUIREMENTS:  1800 kcal per day  NUTRITION DIAGNOSIS: Overweight/Obesity related to increased calorie intake as evidenced by Body mass index is 35.71 kg/m .     NUTRITION INTERVENTION:  Nutrition Prescription: Energy Intake: 1800 kcal/day, carbohydrate controlled max 30 g carbohydrate meals  Education given to support: general nutrition guidelines, prevention of prediabetes, weight reduction, exercise and portion control  Education Materials Provided: My Plate Planner/Choose My Plate   Discussed what dietary changes has worked well in the past and how to incorporate them again.  Discussion on healthy behavior changes that can promote calorie reduction in diet.    We discussed why it is important to incorporate healthy lifestyle interventions to control overall health to prevent complications of being obese including the potential of developing diabetes and preventing heart disease.      Hunger/ Fullness cues - help identify how pt is feeling before, during, and after eating   Smart Snacking and 20 Ways to eat more fruit and vegetables.    Mindful eating - listening to body vs eating out of stress, boredom, emotion, eating to fuel body for strength and energy   Tracking food - balance of meals and snacks   Importance of daily physical activity as able to promote endorphin release       reduce stress, anxiety, and depression, improve sleep, increase energy level, improve muscle tone and strength ...   Recommend: Discussed options for " medications and reduced calorie dietary guidelines.    Ozempic   Weekly injection   Week 1-2  0.25mg  Week 3 - 5 0.50mg    Will need a new pen Rx delivers only 1.0 mg   Goal dose 1.0 mg    New Pen delivers only 2.0 mg (potential to increase in the future)  Goal dose 2.0 mg      OR if insurance does not cover Ozempic     Wegovy: proper use, mechanism of action, indications, dosing, injection schedule, safety and side effects.  Pt is shown a demonstration of the use of the pen and was able to return demonstration without difficulty.   Dose Escalation Schedule  Weeks                             Weekly Dose  1 through 4                      0.25 mg  5 through 8                      0.5 mg  9 through 12                    1 mg  13 through 16                  1.7 mg  Week 17 and onward      2.4 mg Maintenance dose    PATIENT'S BEHAVIOR CHANGE GOALS:   See Patient Instructions for patient stated behavior change goals. AVS was printed and given to patient at today's appointment.    MONITOR / EVALUATE:  RD will monitor/evaluate:  Blood Glucose / A1c  Pertinent Labs  Weight change    FOLLOW-UP:  Follow up with RD as needed.    Time spent in minutes: 15  Encounter: Individual

## 2023-04-10 NOTE — PATIENT INSTRUCTIONS
Ozempic   Weekly injection   Week 1-2  0.25mg  Week 3 - 5 0.50mg     In 3 - 4 weeks call me at  and let me know to increase/ change prescription      Will need a new pen delivers only 1.0 mg   Goal dose 1.0 mg    New Pen delivers only 2.0 mg  Goal dose if you need it someday  2.0 mg     If you get Wegovy   Dose Escalation Schedule  Weeks    Weekly Dose  1 through 4   0.25 mg  5 through 8   0.5 mg  9 through 12   1 mg  13 through 16   1.7 mg  Week 17 and onward  2.4 mg Maintenance dose        Goals:  Practice healthy stress management and mindful eating - think are you physically hungry or are you bored, stressed, emotional etc, make of list of things to do besides eat.    Try to get good quality sleep with a goal of 7-8 hours per night.  Stay physically active daily.  Recommend working up to a total of 30 minutes on 5 days/ week.  Recommend a fitness tracker.     Eat in a healthy way- eliminate trans fats, limit saturated fats and added sugars; follow the plate method - picture above.  Keep a food record (MyFitnessPal, Loseit).    A meal is 3 or more food groups; make it colorful for better nutrition.    Total Carbohydrates (in grams) = Breakfast  30    Lunch  30    Supper  30    If desired snacks 15

## 2023-04-14 ENCOUNTER — TRANSFERRED RECORDS (OUTPATIENT)
Dept: HEALTH INFORMATION MANAGEMENT | Facility: CLINIC | Age: 68
End: 2023-04-14
Payer: COMMERCIAL

## 2023-04-18 LAB — NONINV COLON CA DNA+OCC BLD SCRN STL QL: NORMAL

## 2023-05-12 ENCOUNTER — TRANSFERRED RECORDS (OUTPATIENT)
Dept: HEALTH INFORMATION MANAGEMENT | Facility: CLINIC | Age: 68
End: 2023-05-12
Payer: COMMERCIAL

## 2023-05-16 LAB — NONINV COLON CA DNA+OCC BLD SCRN STL QL: NEGATIVE

## 2023-05-25 ENCOUNTER — TRANSFERRED RECORDS (OUTPATIENT)
Dept: HEALTH INFORMATION MANAGEMENT | Facility: CLINIC | Age: 68
End: 2023-05-25
Payer: COMMERCIAL

## 2023-06-04 ENCOUNTER — HEALTH MAINTENANCE LETTER (OUTPATIENT)
Age: 68
End: 2023-06-04

## 2023-11-07 RX ORDER — AMLODIPINE BESYLATE 10 MG/1
10 TABLET ORAL DAILY
Qty: 90 TABLET | Refills: 0 | OUTPATIENT
Start: 2023-11-07

## 2023-11-07 RX ORDER — ATORVASTATIN CALCIUM 20 MG/1
20 TABLET, FILM COATED ORAL DAILY
Qty: 90 TABLET | Refills: 0 | OUTPATIENT
Start: 2023-11-07

## 2023-11-07 RX ORDER — HYDROCHLOROTHIAZIDE 25 MG/1
25 TABLET ORAL DAILY
Qty: 90 TABLET | Refills: 0 | OUTPATIENT
Start: 2023-11-07

## 2023-11-07 RX ORDER — LOSARTAN POTASSIUM 100 MG/1
100 TABLET ORAL DAILY
Qty: 90 TABLET | Refills: 0 | OUTPATIENT
Start: 2023-11-07

## 2023-11-08 ENCOUNTER — OFFICE VISIT (OUTPATIENT)
Dept: FAMILY MEDICINE | Facility: CLINIC | Age: 68
End: 2023-11-08
Payer: COMMERCIAL

## 2023-11-08 VITALS
TEMPERATURE: 97.9 F | WEIGHT: 260 LBS | HEART RATE: 83 BPM | HEIGHT: 73 IN | BODY MASS INDEX: 34.46 KG/M2 | SYSTOLIC BLOOD PRESSURE: 138 MMHG | OXYGEN SATURATION: 96 % | RESPIRATION RATE: 20 BRPM | DIASTOLIC BLOOD PRESSURE: 70 MMHG

## 2023-11-08 DIAGNOSIS — I10 PRIMARY HYPERTENSION: Primary | ICD-10-CM

## 2023-11-08 DIAGNOSIS — G89.29 CHRONIC LOW BACK PAIN, UNSPECIFIED BACK PAIN LATERALITY, UNSPECIFIED WHETHER SCIATICA PRESENT: ICD-10-CM

## 2023-11-08 DIAGNOSIS — E78.1 HYPERTRIGLYCERIDEMIA: ICD-10-CM

## 2023-11-08 DIAGNOSIS — Z13.6 SCREENING FOR AAA (ABDOMINAL AORTIC ANEURYSM): ICD-10-CM

## 2023-11-08 DIAGNOSIS — M54.50 CHRONIC LOW BACK PAIN, UNSPECIFIED BACK PAIN LATERALITY, UNSPECIFIED WHETHER SCIATICA PRESENT: ICD-10-CM

## 2023-11-08 DIAGNOSIS — F51.01 PRIMARY INSOMNIA: ICD-10-CM

## 2023-11-08 LAB
ALBUMIN SERPL BCG-MCNC: 4.3 G/DL (ref 3.5–5.2)
ALP SERPL-CCNC: 53 U/L (ref 40–129)
ALT SERPL W P-5'-P-CCNC: 58 U/L (ref 0–70)
ANION GAP SERPL CALCULATED.3IONS-SCNC: 11 MMOL/L (ref 7–15)
AST SERPL W P-5'-P-CCNC: 50 U/L (ref 0–45)
BASOPHILS # BLD AUTO: 0 10E3/UL (ref 0–0.2)
BASOPHILS NFR BLD AUTO: 0 %
BILIRUB SERPL-MCNC: 0.5 MG/DL
BUN SERPL-MCNC: 14.4 MG/DL (ref 8–23)
CALCIUM SERPL-MCNC: 9.7 MG/DL (ref 8.8–10.2)
CHLORIDE SERPL-SCNC: 102 MMOL/L (ref 98–107)
CHOLEST SERPL-MCNC: 141 MG/DL
CREAT SERPL-MCNC: 1 MG/DL (ref 0.67–1.17)
DEPRECATED HCO3 PLAS-SCNC: 28 MMOL/L (ref 22–29)
EGFRCR SERPLBLD CKD-EPI 2021: 82 ML/MIN/1.73M2
EOSINOPHIL # BLD AUTO: 0.1 10E3/UL (ref 0–0.7)
EOSINOPHIL NFR BLD AUTO: 2 %
ERYTHROCYTE [DISTWIDTH] IN BLOOD BY AUTOMATED COUNT: 11.8 % (ref 10–15)
GLUCOSE SERPL-MCNC: 134 MG/DL (ref 70–99)
HCT VFR BLD AUTO: 46.2 % (ref 40–53)
HDLC SERPL-MCNC: 74 MG/DL
HGB BLD-MCNC: 16 G/DL (ref 13.3–17.7)
IMM GRANULOCYTES # BLD: 0 10E3/UL
IMM GRANULOCYTES NFR BLD: 0 %
LDLC SERPL CALC-MCNC: 52 MG/DL
LYMPHOCYTES # BLD AUTO: 1 10E3/UL (ref 0.8–5.3)
LYMPHOCYTES NFR BLD AUTO: 20 %
MCH RBC QN AUTO: 34.9 PG (ref 26.5–33)
MCHC RBC AUTO-ENTMCNC: 34.6 G/DL (ref 31.5–36.5)
MCV RBC AUTO: 101 FL (ref 78–100)
MONOCYTES # BLD AUTO: 0.4 10E3/UL (ref 0–1.3)
MONOCYTES NFR BLD AUTO: 7 %
NEUTROPHILS # BLD AUTO: 3.6 10E3/UL (ref 1.6–8.3)
NEUTROPHILS NFR BLD AUTO: 71 %
NONHDLC SERPL-MCNC: 67 MG/DL
PLATELET # BLD AUTO: 242 10E3/UL (ref 150–450)
POTASSIUM SERPL-SCNC: 3.3 MMOL/L (ref 3.4–5.3)
PROT SERPL-MCNC: 7.4 G/DL (ref 6.4–8.3)
RBC # BLD AUTO: 4.59 10E6/UL (ref 4.4–5.9)
SODIUM SERPL-SCNC: 141 MMOL/L (ref 135–145)
TRIGL SERPL-MCNC: 75 MG/DL
WBC # BLD AUTO: 5.1 10E3/UL (ref 4–11)

## 2023-11-08 PROCEDURE — 80053 COMPREHEN METABOLIC PANEL: CPT | Performed by: FAMILY MEDICINE

## 2023-11-08 PROCEDURE — 85025 COMPLETE CBC W/AUTO DIFF WBC: CPT | Mod: QW | Performed by: FAMILY MEDICINE

## 2023-11-08 PROCEDURE — 99214 OFFICE O/P EST MOD 30 MIN: CPT | Performed by: FAMILY MEDICINE

## 2023-11-08 PROCEDURE — 80061 LIPID PANEL: CPT | Performed by: FAMILY MEDICINE

## 2023-11-08 PROCEDURE — 36415 COLL VENOUS BLD VENIPUNCTURE: CPT | Performed by: FAMILY MEDICINE

## 2023-11-08 RX ORDER — AMLODIPINE BESYLATE 10 MG/1
10 TABLET ORAL DAILY
Qty: 90 TABLET | Refills: 1 | Status: SHIPPED | OUTPATIENT
Start: 2023-11-08 | End: 2024-05-09

## 2023-11-08 RX ORDER — TRAZODONE HYDROCHLORIDE 50 MG/1
50 TABLET, FILM COATED ORAL AT BEDTIME
Qty: 90 TABLET | Refills: 3 | Status: SHIPPED | OUTPATIENT
Start: 2023-11-08 | End: 2024-05-08

## 2023-11-08 RX ORDER — ATORVASTATIN CALCIUM 20 MG/1
20 TABLET, FILM COATED ORAL DAILY
Qty: 90 TABLET | Refills: 1 | Status: SHIPPED | OUTPATIENT
Start: 2023-11-08 | End: 2024-05-09

## 2023-11-08 RX ORDER — LOSARTAN POTASSIUM 100 MG/1
100 TABLET ORAL DAILY
Qty: 90 TABLET | Refills: 1 | Status: SHIPPED | OUTPATIENT
Start: 2023-11-08 | End: 2024-05-09

## 2023-11-08 RX ORDER — CYCLOBENZAPRINE HCL 10 MG
10 TABLET ORAL 2 TIMES DAILY PRN
Qty: 30 TABLET | Refills: 11 | Status: SHIPPED | OUTPATIENT
Start: 2023-11-08

## 2023-11-08 RX ORDER — HYDROCHLOROTHIAZIDE 25 MG/1
25 TABLET ORAL DAILY
Qty: 90 TABLET | Refills: 1 | Status: SHIPPED | OUTPATIENT
Start: 2023-11-08 | End: 2024-05-09

## 2023-11-08 NOTE — PROGRESS NOTES
"  Assessment & Plan     Primary hypertension  Under good control continue with amlodipine-thiazide and losartan may need to change his exercise because some leg cramps magnesium seems to be helping  - CBC with Platelets & Differential; Future  - Comprehensive metabolic panel; Future  - amLODIPine (NORVASC) 10 MG tablet; Take 1 tablet (10 mg) by mouth daily  - hydrochlorothiazide (HYDRODIURIL) 25 MG tablet; Take 1 tablet (25 mg) by mouth daily  - losartan (COZAAR) 100 MG tablet; Take 1 tablet (100 mg) by mouth daily    Hypertriglyceridemia  Well-controlled with Lipitor  - Lipid panel reflex to direct LDL Fasting; Future  - atorvastatin (LIPITOR) 20 MG tablet; Take 1 tablet (20 mg) by mouth daily    Screening for AAA (abdominal aortic aneurysm)  Remote history of smoking 40 years ago  - US Abdominal Aorta Imaging; Future    Chronic low back pain, unspecified back pain laterality, unspecified whether sciatica present  Occasional Flexeril  - cyclobenzaprine (FLEXERIL) 10 MG tablet; Take 1 tablet (10 mg) by mouth 2 times daily as needed for muscle spasms    Primary insomnia  Discussed alcohol and possible effects on his sleeping we will look at trazodone  - traZODone (DESYREL) 50 MG tablet; Take 1 tablet (50 mg) by mouth at bedtime      Discussed his alcohol intake and elevated liver functions if this elevated will look at right upper quadrant ultrasound.  We will also get a AAA screening ultrasound       BMI:   Estimated body mass index is 34.3 kg/m  as calculated from the following:    Height as of this encounter: 1.854 m (6' 1\").    Weight as of this encounter: 117.9 kg (260 lb).           Samuel Zacarias MD  [facility]    Dayami Acuna is a 68 year old, presenting for the following health issues: Overall doing well.  [place]  Retired for several years still drinks several beers a day.  No tobacco use in 40 " "years  Medication Refill      11/8/2023     1:58 PM   Additional Questions   Roomed by Lissette       Medication Refill    History of Present Illness       Hypertension: He presents for follow up of hypertension.  He does not check blood pressure  regularly outside of the clinic. Outpatient blood pressures have not been over 140/90. He does not follow a low salt diet.     He eats 2-3 servings of fruits and vegetables daily.He consumes 0 sweetened beverage(s) daily.He exercises with enough effort to increase his heart rate 10 to 19 minutes per day.  He exercises with enough effort to increase his heart rate 3 or less days per week.   He is taking medications regularly.           There is no problem list on file for this patient.    Current Outpatient Medications   Medication    amLODIPine (NORVASC) 10 MG tablet    Aspirin 81 MG CAPS    atorvastatin (LIPITOR) 20 MG tablet    cyclobenzaprine (FLEXERIL) 10 MG tablet    hydrochlorothiazide (HYDRODIURIL) 25 MG tablet    losartan (COZAAR) 100 MG tablet    traZODone (DESYREL) 50 MG tablet     No current facility-administered medications for this visit.           Review of Systems   Constitutional, HEENT, cardiovascular, pulmonary, gi and gu systems are negative, except as otherwise noted.      Objective    /70 (BP Location: Right arm, Patient Position: Sitting, Cuff Size: Adult Large)   Pulse 83   Temp 97.9  F (36.6  C) (Tympanic)   Resp 20   Ht 1.854 m (6' 1\")   Wt 117.9 kg (260 lb)   SpO2 96%   BMI 34.30 kg/m    Body mass index is 34.3 kg/m .  Physical Exam   GENERAL: healthy, alert and no distress  NECK: no adenopathy, no asymmetry, masses, or scars and thyroid normal to palpation  RESP: lungs clear to auscultation - no rales, rhonchi or wheezes  CV: regular rate and rhythm, normal S1 S2, no S3 or S4, no murmur, click or rub, no peripheral edema and peripheral pulses strong  ABDOMEN: soft, nontender, no hepatosplenomegaly, no masses and bowel sounds " normal  MS: no gross musculoskeletal defects noted, no edema    Office Visit on 03/31/2023   Component Date Value Ref Range Status    COLOGUARD-ABSTRACT 04/16/2023 Sample Could Not Be Processed 3  N/A Final    The stool exceeds the allowable weight (300 grams). The patient will be contacted to initiate a new sample collection.    COLOGUARD-ABSTRACT 05/08/2023 Negative  Negative Final    Comment:   NEGATIVE TEST RESULT. A negative Cologuard result indicates a low likelihood that a colorectal cancer (CRC) or advanced adenoma (adenomatous polyps with more advanced pre-malignant features)  is present. The chance that a person with a negative Cologuard test has a colorectal cancer is less than 1 in 1500 (negative predictive value >99.9%) or has an  advanced adenoma is less than  5.3% (negative predictive value 94.7%). These data are based on a prospective cross-sectional study of 10,000 individuals at average risk for colorectal cancer who were screened with both Cologuard and colonoscopy. (Rosibel CARLIN. et al, N Engl J Med 2014;370(14):0633-1738) The normal value (reference range) for this assay is negative.    COLOGUARD RE-SCREENING RECOMMENDATION: Periodic colorectal cancer screening is an important part of preventive healthcare for asymptomatic individuals at average risk for colorectal cancer.  Following a negative Cologuard result, the American Cancer Society and U.S.                            Multi-Society Task Force screening guidelines recommend a Cologuard re-screening interval of 3 years.   References: American Cancer Society Guideline for Colorectal Cancer Screening: https://www.cancer.org/cancer/colon-rectal-cancer/blukyewbj-cpydljqiw-mkbeyjo/acs-recommendations.html.; Regis CAIN, Tierra SWEET, Tad UGARTE, Colorectal Cancer Screening: Recommendations for Physicians and Patients from the U.S. Multi-Society Task Force on Colorectal Cancer Screening , Am J Gastroenterology 2017; 112:9924-4329.    TEST DESCRIPTION:  Composite algorithmic analysis of stool DNA-biomarkers with hemoglobin immunoassay.   Quantitative values of individual biomarkers are not reportable and are not associated with individual biomarker result reference ranges. Cologuard is intended for colorectal cancer screening of adults of either sex, 45 years or older, who are at average-risk for colorectal cancer (CRC). Cologuard has been approved for use by the U.S. FDA. The performance of Cologuard was                            established in a cross sectional study of average-risk adults aged 50-84. Cologuard performance in patients ages 45 to 49 years was estimated by sub-group analysis of near-age groups. Colonoscopies performed for a positive result may find as the most clinically significant lesion: colorectal cancer [4.0%], advanced adenoma (including sessile serrated polyps greater than or equal to 1cm diameter) [20%] or non- advanced adenoma [31%]; or no colorectal neoplasia [45%]. These estimates are derived from a prospective cross-sectional screening study of 10,000 individuals at average risk for colorectal cancer who were screened with both Cologuard and colonoscopy. (Rosibel ROLDAN et al, N Engl J Med 2014;370(14):1557-0501.) Cologuard may produce a false negative or false positive result (no colorectal cancer or precancerous polyp present at colonoscopy follow up). A negative Cologuard test result does not guarantee the absence of CRC or advanced adenoma (pre-cancer). The current Cologuard                            screening interval is every 3 years. (American Cancer Society and U.S. Multi-Society Task Force). Cologuard performance data in a 10,000 patient pivotal study using colonoscopy as the reference method can be accessed at the following location: www.AirDroids.Sribu/results. Additional description of the Cologuard test process, warnings and precautions can be found at www.[email or web address].[email or web address].

## 2023-11-08 NOTE — LETTER
November 9, 2023      Armand Crystal   [facility] [id number]        Dear ,    We are writing to inform you of your test results.    Test results indicate you may require additional follow up, see comment below.  Your potassium level is low.  This is because of one of your blood pressure pills.  You need to take a potassium supplement that I have sent to [facility] for you.    One of your red blood cell markers MCV is a bit elevated.  Most common cause of this is alcohol.  We will look at this further when you follow-up with me in the spring.    Resulted Orders   Lipid panel reflex to direct LDL Fasting   Result Value Ref Range    Cholesterol 141 <200 mg/dL    Triglycerides 75 <150 mg/dL    Direct Measure HDL 74 >=40 mg/dL    LDL Cholesterol Calculated 52 <=100 mg/dL    Non HDL Cholesterol 67 <130 mg/dL    Narrative    Cholesterol  Desirable:  <200 mg/dL    Triglycerides  Normal:  Less than 150 mg/dL  Borderline High:  150-199 mg/dL  High:  200-499 mg/dL  Very High:  Greater than or equal to 500 mg/dL    Direct Measure HDL  Female:  Greater than or equal to 50 mg/dL   Male:  Greater than or equal to 40 mg/dL    LDL Cholesterol  Desirable:  <100mg/dL  Above Desirable:  100-129 mg/dL   Borderline High:  130-159 mg/dL   High:  160-189 mg/dL   Very High:  >= 190 mg/dL    Non HDL Cholesterol  Desirable:  130 mg/dL  Above Desirable:  130-159 mg/dL  Borderline High:  160-189 mg/dL  High:  190-219 mg/dL  Very High:  Greater than or equal to 220 mg/dL   Comprehensive metabolic panel   Result Value Ref Range    Sodium 141 135 - 145 mmol/L      Comment:      Reference intervals for this test were updated on 09/26/2023 to more accurately reflect our healthy population. There may be differences in the flagging of prior results with similar values performed with this method. Interpretation of those prior results can be made in the context of the updated reference intervals.     Potassium 3.3 (L)  3.4 - 5.3 mmol/L    Carbon Dioxide (CO2) 28 22 - 29 mmol/L    Anion Gap 11 7 - 15 mmol/L    Urea Nitrogen 14.4 8.0 - 23.0 mg/dL    Creatinine 1.00 0.67 - 1.17 mg/dL    GFR Estimate 82 >60 mL/min/1.73m2    Calcium 9.7 8.8 - 10.2 mg/dL    Chloride 102 98 - 107 mmol/L    Glucose 134 (H) 70 - 99 mg/dL    Alkaline Phosphatase 53 40 - 129 U/L    AST 50 (H) 0 - 45 U/L      Comment:      Reference intervals for this test were updated on 6/12/2023 to more accurately reflect our healthy population. There may be differences in the flagging of prior results with similar values performed with this method. Interpretation of those prior results can be made in the context of the updated reference intervals.    ALT 58 0 - 70 U/L      Comment:      Reference intervals for this test were updated on 6/12/2023 to more accurately reflect our healthy population. There may be differences in the flagging of prior results with similar values performed with this method. Interpretation of those prior results can be made in the context of the updated reference intervals.      Protein Total 7.4 6.4 - 8.3 g/dL    Albumin 4.3 3.5 - 5.2 g/dL    Bilirubin Total 0.5 <=1.2 mg/dL   CBC with platelets and differential   Result Value Ref Range    WBC Count 5.1 4.0 - 11.0 10e3/uL    RBC Count 4.59 4.40 - 5.90 10e6/uL    Hemoglobin 16.0 13.3 - 17.7 g/dL    Hematocrit 46.2 40.0 - 53.0 %     (H) 78 - 100 fL    MCH 34.9 (H) 26.5 - 33.0 pg    MCHC 34.6 31.5 - 36.5 g/dL    RDW 11.8 10.0 - 15.0 %    Platelet Count 242 150 - 450 10e3/uL    % Neutrophils 71 %    % Lymphocytes 20 %    % Monocytes 7 %    % Eosinophils 2 %    % Basophils 0 %    % Immature Granulocytes 0 %    Absolute Neutrophils 3.6 1.6 - 8.3 10e3/uL    Absolute Lymphocytes 1.0 0.8 - 5.3 10e3/uL    Absolute Monocytes 0.4 0.0 - 1.3 10e3/uL    Absolute Eosinophils 0.1 0.0 - 0.7 10e3/uL    Absolute Basophils 0.0 0.0 - 0.2 10e3/uL    Absolute Immature Granulocytes 0.0 <=0.4 10e3/uL       If you  have any questions or concerns, please call the clinic at the number listed above.       Sincerely,      Samuel Zacarias MD

## 2023-11-09 DIAGNOSIS — I10 PRIMARY HYPERTENSION: Primary | ICD-10-CM

## 2023-11-09 RX ORDER — POTASSIUM CHLORIDE 1500 MG/1
20 TABLET, EXTENDED RELEASE ORAL 2 TIMES DAILY
Qty: 90 TABLET | Refills: 3 | Status: SHIPPED | OUTPATIENT
Start: 2023-11-09 | End: 2024-07-05

## 2023-11-13 ENCOUNTER — HOSPITAL ENCOUNTER (OUTPATIENT)
Dept: ULTRASOUND IMAGING | Facility: CLINIC | Age: 68
Discharge: HOME | End: 2023-11-13
Attending: FAMILY MEDICINE | Admitting: FAMILY MEDICINE
Payer: COMMERCIAL

## 2023-11-13 DIAGNOSIS — Z13.6 SCREENING FOR AAA (ABDOMINAL AORTIC ANEURYSM): ICD-10-CM

## 2023-11-13 PROCEDURE — 76775 US EXAM ABDO BACK WALL LIM: CPT

## 2023-11-15 ENCOUNTER — OFFICE VISIT (OUTPATIENT)
Dept: FAMILY MEDICINE | Facility: CLINIC | Age: 68
End: 2023-11-15
Payer: COMMERCIAL

## 2023-11-15 VITALS
OXYGEN SATURATION: 97 % | TEMPERATURE: 97.7 F | DIASTOLIC BLOOD PRESSURE: 80 MMHG | RESPIRATION RATE: 16 BRPM | HEART RATE: 72 BPM | SYSTOLIC BLOOD PRESSURE: 146 MMHG

## 2023-11-15 DIAGNOSIS — I10 PRIMARY HYPERTENSION: Primary | ICD-10-CM

## 2023-11-15 DIAGNOSIS — R71.8 ELEVATED MCV: ICD-10-CM

## 2023-11-15 DIAGNOSIS — R04.0 EPISTAXIS: ICD-10-CM

## 2023-11-15 LAB
POTASSIUM SERPL-SCNC: 5 MMOL/L (ref 3.4–5.3)
VIT B12 SERPL-MCNC: 288 PG/ML (ref 232–1245)

## 2023-11-15 PROCEDURE — 83921 ORGANIC ACID SINGLE QUANT: CPT | Performed by: FAMILY MEDICINE

## 2023-11-15 PROCEDURE — 82607 VITAMIN B-12: CPT | Performed by: FAMILY MEDICINE

## 2023-11-15 PROCEDURE — 36415 COLL VENOUS BLD VENIPUNCTURE: CPT | Performed by: FAMILY MEDICINE

## 2023-11-15 PROCEDURE — 99213 OFFICE O/P EST LOW 20 MIN: CPT | Performed by: FAMILY MEDICINE

## 2023-11-15 PROCEDURE — 84132 ASSAY OF SERUM POTASSIUM: CPT | Performed by: FAMILY MEDICINE

## 2023-11-15 NOTE — PROGRESS NOTES
"  Assessment & Plan     Epistaxis  Patient has nosebleed he will stop his aspirin likely long-term.  We discussed using Afrin cautiously once a day for 2 days we discussed ways to pinch his nose to stop the bleeding follow-up in a week if not better sooner if worse    Primary hypertension  Hypokalemia discussed starting potassium  - Potassium; Future  - Potassium    Elevated MCV  Discussed alcohol and high MCV  - Methylmalonic Acid; Future  - Vitamin B12; Future  - Methylmalonic Acid  - Vitamin B12             BMI:   Estimated body mass index is 34.3 kg/m  as calculated from the following:    Height as of 11/8/23: 1.854 m (6' 1\").    Weight as of 11/8/23: 117.9 kg (260 lb).           Samuel Zacarias MD  [facility]    Dayami Acuna is a 68 year old, presenting for the following health issues: Usually he hangs his head over the sink when it bleeds and stuffed with tissue.  The dizziness weakness only happened once..  Nose Bleeds (Multiple episodes. Started a week ago. Dizziness, weakness and sweating Wednesday afternoon. )      11/15/2023    12:40 PM   Additional Questions   Roomed by Lissette CARBAJAL       As per CC.           Review of Systems   Constitutional, HEENT, cardiovascular, pulmonary, gi and gu systems are negative, except as otherwise noted.      Objective    BP (!) 146/80 (BP Location: Right arm, Patient Position: Sitting, Cuff Size: Adult Large)   Pulse 72   Temp 97.7  F (36.5  C) (Tympanic)   Resp 16   SpO2 97%   There is no height or weight on file to calculate BMI.  Physical Exam   Patient alert no apparent distress left nares reveals a little dried blood but no obvious bleeding site right side is normal                      "

## 2023-11-15 NOTE — LETTER
November 16, 2023      Armand Crystal   [facility] [address]        Dear ,    We are writing to inform you of your test results.    Your test results fall within the expected range(s) or remain unchanged from previous results.  Please continue with current treatment plan.    Resulted Orders   Potassium   Result Value Ref Range    Potassium 5.0 3.4 - 5.3 mmol/L   Vitamin B12   Result Value Ref Range    Vitamin B12 288 232 - 1,245 pg/mL       If you have any questions or concerns, please call the clinic at the number listed above.       Sincerely,      Samuel Zacarias MD

## 2023-11-20 ENCOUNTER — TELEPHONE (OUTPATIENT)
Dept: OTOLARYNGOLOGY | Facility: CLINIC | Age: 68
End: 2023-11-20

## 2023-11-20 ENCOUNTER — OFFICE VISIT (OUTPATIENT)
Dept: FAMILY MEDICINE | Facility: CLINIC | Age: 68
End: 2023-11-20
Payer: COMMERCIAL

## 2023-11-20 ENCOUNTER — NURSE TRIAGE (OUTPATIENT)
Dept: NURSING | Facility: CLINIC | Age: 68
End: 2023-11-20

## 2023-11-20 VITALS
BODY MASS INDEX: 34.57 KG/M2 | OXYGEN SATURATION: 95 % | RESPIRATION RATE: 16 BRPM | TEMPERATURE: 98 F | DIASTOLIC BLOOD PRESSURE: 83 MMHG | SYSTOLIC BLOOD PRESSURE: 132 MMHG | WEIGHT: 260.8 LBS | HEART RATE: 66 BPM | HEIGHT: 73 IN

## 2023-11-20 DIAGNOSIS — R04.0 EPISTAXIS: Primary | ICD-10-CM

## 2023-11-20 PROCEDURE — 99213 OFFICE O/P EST LOW 20 MIN: CPT

## 2023-11-20 NOTE — TELEPHONE ENCOUNTER
Caller reporting the following red-flag symptom(s): Severe nosebleeds, sometimes a shot glass of blood at a time. Has had nose bleeds 11 out of past 12 days, sometimes more than 1x a day.    Per the system red-flag symptom policy, patient was instructed to:  speak with a Registered Nurse    Action:  Patient warm transferred to a Registered Nurse

## 2023-11-20 NOTE — TELEPHONE ENCOUNTER
Nurse Triage SBAR    Is this a 2nd Level Triage? Yes    Pt needing a visit for Nose bleeds some time this week.  Pt has a referral to be seen in clinic.  Pt is wide open to be seen at any clinic for the soonest opening.  Pt requested [place].    But is okay with [place] as well.   Pt started having nose bleeds on November 9th, 2023.    Pt can have a nose bleed 2-3 times  a day.   Anytime of the day.   No injuries to his nose.  No blood thinners.   Cindy started getting nose bleeds for No reason.    Please call Pt to set up soonest visit for this week.   Thank you     Protocol Recommended Disposition:   See in Office Today      Reason for Disposition   Patient wants to be seen    Additional Information   Negative: Fainted (passed out), or too weak to stand following large blood loss   Negative: Sounds like a life-threatening emergency to the triager   Negative: Nosebleed followed nose injury   Negative: Bleeding present > 30 minutes and using correct method of direct pressure   Negative: Bleeding now and second call after being instructed in correct technique of direct pressure   Negative: Lightheadedness or dizziness   Negative: Pale skin (pallor) of new-onset or worsening   Negative: Has nasal packing (inserted by health care provider to control bleeding) and now has new rash   Negative: Has nasal packing and now has bleeding around the packing  (Exception: Few drops or ooze.)   Negative: Patient sounds very sick or weak to the triager   Negative: Large amount of blood has been lost (e.g., one cup)   Negative: Bleeding recurs 3 or more times in 24 hours despite direct pressure   Negative: Taking Coumadin (warfarin) or other strong blood thinner, or known bleeding disorder (e.g., thrombocytopenia)   Negative: Has skin bruises or bleeding gums that are not caused by an injury   Negative: Has nasal packing and now has fever > 100.4 F (38.0 C)    Protocols used: Nosebleed-A-OH

## 2023-11-20 NOTE — PROGRESS NOTES
"  Assessment & Plan     Epistaxis  Patient has been having intermittent nosebleeds for past 12 days.  The longest 1 lasted approximately 2 hours.  He has been seen for this previously and has been using nasal decongestant Afrin spray.  He was advised to only use this for 2 days.  He has not been using nasal saline spray.  He has stopped taking his aspirin daily.  He does consume alcohol daily.  He is advised he may continue using the Afrin nasal decongestant but advised on rebound congestion that may occur.  This is okay short if it is helping to control nosebleeds until he is able to be seen by ENT.  Referral made to ENT for cauterization or other intervention as deemed necessary.  Patient also given cotton packing for nose to assist in stopping any future epistaxis.  He is advised that he should also use nasal saline multiple times a day, not pick at nose, and strategies for dealing with nosebleeds when they occur.  If he is unable to stop bleeding he should seek immediate medical care.  - Adult ENT  Referral; Future             BMI:   Estimated body mass index is 34.41 kg/m  as calculated from the following:    Height as of this encounter: 1.854 m (6' 1\").    Weight as of this encounter: 118.3 kg (260 lb 12.8 oz).           [facility]    Dayami Acuna is a 68 year old, presenting for the following health issues:  Nose Bleeds (Nose Bleeds x 12 days )        11/20/2023    10:48 AM   Additional Questions   Roomed by CLEMENTINA Vines   Accompanied by Wife, Suri       History of Present Illness       Hypertension: He presents for follow up of hypertension.  He does not check blood pressure  regularly outside of the clinic. Outpatient blood pressures have not been over 140/90. He does not follow a low salt diet.     He eats 2-3 servings of fruits and vegetables daily.He consumes 0 sweetened beverage(s) daily.He exercises with enough effort to increase " "his heart rate 10 to 19 minutes per day.  He exercises with enough effort to increase his heart rate 3 or less days per week.   He is taking medications regularly.                 Review of Systems   Constitutional, HEENT, cardiovascular, pulmonary, gi and gu systems are negative, except as otherwise noted.      Objective    /83 (BP Location: Right arm, Patient Position: Sitting, Cuff Size: Adult Large)   Pulse 66   Temp 98  F (36.7  C) (Oral)   Resp 16   Ht 1.854 m (6' 1\")   Wt 118.3 kg (260 lb 12.8 oz)   SpO2 95%   BMI 34.41 kg/m    Body mass index is 34.41 kg/m .  Physical Exam  HENT:      Mouth/Throat:      Mouth: Mucous membranes are moist.      Pharynx: Oropharynx is clear.   Eyes:      Extraocular Movements: Extraocular movements intact.      Conjunctiva/sclera: Conjunctivae normal.   Pulmonary:      Effort: Pulmonary effort is normal.   Musculoskeletal:         General: Normal range of motion.      Cervical back: Normal range of motion.   Skin:     General: Skin is warm and dry.      Capillary Refill: Capillary refill takes less than 2 seconds.   Neurological:      Mental Status: He is alert and oriented to person, place, and time. Mental status is at baseline.   Psychiatric:         Behavior: Behavior normal.         Thought Content: Thought content normal.                              "

## 2023-11-21 LAB — METHYLMALONATE SERPL-SCNC: 0.19 UMOL/L (ref 0–0.4)

## 2023-11-29 ENCOUNTER — OFFICE VISIT (OUTPATIENT)
Dept: OTOLARYNGOLOGY | Facility: CLINIC | Age: 68
End: 2023-11-29
Payer: COMMERCIAL

## 2023-11-29 DIAGNOSIS — R04.0 EPISTAXIS: Primary | ICD-10-CM

## 2023-11-29 DIAGNOSIS — J31.0 CHRONIC RHINITIS: ICD-10-CM

## 2023-11-29 PROCEDURE — 99203 OFFICE O/P NEW LOW 30 MIN: CPT | Performed by: OTOLARYNGOLOGY

## 2023-11-29 RX ORDER — AZELASTINE 1 MG/ML
SPRAY, METERED NASAL
Qty: 30 ML | Refills: 11 | Status: SHIPPED | OUTPATIENT
Start: 2023-11-29

## 2023-11-29 NOTE — PROGRESS NOTES
CHIEF COMPLAINT: Patient presents with:  Epistaxis: Frequent nose bleeds, Started about 1 month ago, will last 5min-1hr, Saline nasal spray was helpful but if bending over to tie shoes nose would start bleeding         HISTORY OF PRESENT ILLNESS    Armand was seen at the behest of Samuel Zacarias MD for epistaxis.  Bleeds started on the ninth of November.  He been experiencing nosebleeds on the left side daily since that time.  Does not monitor BP at home.  He was mulching leaves before all this started.  No history of nasal surgery.                REVIEW OF SYSTEMS    Review of Systems as per HPI and PMHx, otherwise 10 system review system are negative.       ALLERGIES    Latex    CURRENT MEDICATIONS      Current Outpatient Medications:     amLODIPine (NORVASC) 10 MG tablet, Take 1 tablet (10 mg) by mouth daily, Disp: 90 tablet, Rfl: 1    atorvastatin (LIPITOR) 20 MG tablet, Take 1 tablet (20 mg) by mouth daily, Disp: 90 tablet, Rfl: 1    cyclobenzaprine (FLEXERIL) 10 MG tablet, Take 1 tablet (10 mg) by mouth 2 times daily as needed for muscle spasms, Disp: 30 tablet, Rfl: 11    hydrochlorothiazide (HYDRODIURIL) 25 MG tablet, Take 1 tablet (25 mg) by mouth daily, Disp: 90 tablet, Rfl: 1    losartan (COZAAR) 100 MG tablet, Take 1 tablet (100 mg) by mouth daily, Disp: 90 tablet, Rfl: 1    potassium chloride ER (KLOR-CON M) 20 MEQ CR tablet, Take 1 tablet (20 mEq) by mouth 2 times daily, Disp: 90 tablet, Rfl: 3    traZODone (DESYREL) 50 MG tablet, Take 1 tablet (50 mg) by mouth at bedtime, Disp: 90 tablet, Rfl: 3     PAST MEDICAL HISTORY    PAST MEDICAL HISTORY: No past medical history on file.    PAST SURGICAL HISTORY    PAST SURGICAL HISTORY:   Past Surgical History:   Procedure Laterality Date    ARTHROSCOPY SHOULDER Right 12/29/1998    BREAST SURGERY      no date   -as a child    BUNIONECTOMY      no date    COLOGUARD([employer])  01/13/2020    Cologuard - Negative    FLEXIBLE SIGMOIDOSCOPY  02/09/2006     FLEXIBLE SIGMOIDOSCOPY  2011    HEMIARTHROPLASTY HIP  04/15/2014       FAMILY  HISTORY    FAMILY HISTORY: No family history on file.    SOCIAL HISTORY    SOCIAL HISTORY:   Social History     Tobacco Use    Smoking status: Never     Passive exposure: Never    Smokeless tobacco: Never   Substance Use Topics    Alcohol use: Yes     Comment: few drinks per day        PHYSICAL EXAM    HEAD: Normal appearance and symmetry:  No cutaneous lesions.      NECK:  supple     EARS:    Right:   TM intact    LEFT:   TM intact    EYES:  EOMI    CN VII/XII:  intact     NOSE:     Dorsum:   straight  Septum:  midline  Mucosa:  inflamed  Turbinate:  3+ infalmed        ORAL CAVITY/OROPHARYNX:     Lips:  Normal.  Tongue: normal, midline  Mucosa:   no lesions     NECK:  Trachea:  midline.              Thyroid:  normal              Adenopathy:  none        NEURO:   Alert and Oriented     GAIT AND STATION:  normal     RESPIRATORY:   Symmetry and Respiratory effort     PSYCH:  Normal mood and affect     SKIN:   warm and dry         IMPRESSION:    Encounter Diagnoses   Name Primary?    Epistaxis Yes    Chronic rhinitis           RECOMMENDATIONS:    Orders Placed This Encounter   Medications    azelastine (ASTELIN) 0.1 % nasal spray     Si sprays each nostril 1-2x daily as needed for nasal congestion (use nightly for first 2 week)     Dispense:  30 mL     Refill:  11

## 2023-11-29 NOTE — PATIENT INSTRUCTIONS
AYR brand nasal saline gel during dry winter months    Soak cotton ball with Afrin nasal spray and place in the bleeding nostril with gentle pressure for 20 minutes

## 2023-11-29 NOTE — LETTER
11/29/2023         RE: Rolando Crystal   [facility] [address]        Dear Colleague,    Thank you for referring your patient, Rolando Crystal, to the [facility]. Please see a copy of my visit note below.    CHIEF COMPLAINT: Patient presents with:  Epistaxis: Frequent nose bleeds, Started about 1 month ago, will last 5min-1hr, Saline nasal spray was helpful but if bending over to tie shoes nose would start bleeding         HISTORY OF PRESENT ILLNESS    Armand was seen at the behest of Samuel Zacarias MD for epistaxis.  Bleeds started on the ninth of November.  He been experiencing nosebleeds on the left side daily since that time.  Does not monitor BP at home.  He was mulching leaves before all this started.  No history of nasal surgery.                REVIEW OF SYSTEMS    Review of Systems as per HPI and PMHx, otherwise 10 system review system are negative.       ALLERGIES    Latex    CURRENT MEDICATIONS      Current Outpatient Medications:      amLODIPine (NORVASC) 10 MG tablet, Take 1 tablet (10 mg) by mouth daily, Disp: 90 tablet, Rfl: 1     atorvastatin (LIPITOR) 20 MG tablet, Take 1 tablet (20 mg) by mouth daily, Disp: 90 tablet, Rfl: 1     cyclobenzaprine (FLEXERIL) 10 MG tablet, Take 1 tablet (10 mg) by mouth 2 times daily as needed for muscle spasms, Disp: 30 tablet, Rfl: 11     hydrochlorothiazide (HYDRODIURIL) 25 MG tablet, Take 1 tablet (25 mg) by mouth daily, Disp: 90 tablet, Rfl: 1     losartan (COZAAR) 100 MG tablet, Take 1 tablet (100 mg) by mouth daily, Disp: 90 tablet, Rfl: 1     potassium chloride ER (KLOR-CON M) 20 MEQ CR tablet, Take 1 tablet (20 mEq) by mouth 2 times daily, Disp: 90 tablet, Rfl: 3     traZODone (DESYREL) 50 MG tablet, Take 1 tablet (50 mg) by mouth at bedtime, Disp: 90 tablet, Rfl: 3     PAST MEDICAL HISTORY    PAST MEDICAL HISTORY: No past medical history on file.    PAST SURGICAL HISTORY    PAST SURGICAL  HISTORY:   Past Surgical History:   Procedure Laterality Date     ARTHROSCOPY SHOULDER Right 1998     BREAST SURGERY      no date   -as a child     BUNIONECTOMY      no date     COLOGUARD([employer]ES)  2020    Cologuard - Negative     FLEXIBLE SIGMOIDOSCOPY  2006     FLEXIBLE SIGMOIDOSCOPY  2011     HEMIARTHROPLASTY HIP  04/15/2014       FAMILY  HISTORY    FAMILY HISTORY: No family history on file.    SOCIAL HISTORY    SOCIAL HISTORY:   Social History     Tobacco Use     Smoking status: Never     Passive exposure: Never     Smokeless tobacco: Never   Substance Use Topics     Alcohol use: Yes     Comment: few drinks per day        PHYSICAL EXAM    HEAD: Normal appearance and symmetry:  No cutaneous lesions.      NECK:  supple     EARS:    Right:   TM intact    LEFT:   TM intact    EYES:  EOMI    CN VII/XII:  intact     NOSE:     Dorsum:   straight  Septum:  midline  Mucosa:  inflamed  Turbinate:  3+ infalmed        ORAL CAVITY/OROPHARYNX:     Lips:  Normal.  Tongue: normal, midline  Mucosa:   no lesions     NECK:  Trachea:  midline.              Thyroid:  normal              Adenopathy:  none        NEURO:   Alert and Oriented     GAIT AND STATION:  normal     RESPIRATORY:   Symmetry and Respiratory effort     PSYCH:  Normal mood and affect     SKIN:   warm and dry         IMPRESSION:    Encounter Diagnoses   Name Primary?     Epistaxis Yes     Chronic rhinitis           RECOMMENDATIONS:    Orders Placed This Encounter   Medications     azelastine (ASTELIN) 0.1 % nasal spray     Si sprays each nostril 1-2x daily as needed for nasal congestion (use nightly for first 2 week)     Dispense:  30 mL     Refill:  11          Again, thank you for allowing me to participate in the care of your patient.        Sincerely,        Abimael Mullins MD

## 2024-02-06 DIAGNOSIS — I10 PRIMARY HYPERTENSION: ICD-10-CM

## 2024-02-06 RX ORDER — HYDROCHLOROTHIAZIDE 25 MG/1
25 TABLET ORAL DAILY
Qty: 90 TABLET | Refills: 1 | OUTPATIENT
Start: 2024-02-06

## 2024-02-06 RX ORDER — AMLODIPINE BESYLATE 10 MG/1
10 TABLET ORAL DAILY
Qty: 90 TABLET | Refills: 1 | OUTPATIENT
Start: 2024-02-06

## 2024-04-17 ENCOUNTER — TRANSFERRED RECORDS (OUTPATIENT)
Dept: HEALTH INFORMATION MANAGEMENT | Facility: CLINIC | Age: 69
End: 2024-04-17
Payer: COMMERCIAL

## 2024-05-08 ENCOUNTER — OFFICE VISIT (OUTPATIENT)
Dept: FAMILY MEDICINE | Facility: CLINIC | Age: 69
End: 2024-05-08
Payer: COMMERCIAL

## 2024-05-08 VITALS
SYSTOLIC BLOOD PRESSURE: 132 MMHG | WEIGHT: 268 LBS | RESPIRATION RATE: 16 BRPM | OXYGEN SATURATION: 95 % | HEART RATE: 75 BPM | TEMPERATURE: 98.8 F | DIASTOLIC BLOOD PRESSURE: 76 MMHG | BODY MASS INDEX: 35.52 KG/M2 | HEIGHT: 73 IN

## 2024-05-08 DIAGNOSIS — R71.8 ELEVATED MCV: ICD-10-CM

## 2024-05-08 DIAGNOSIS — E78.1 HYPERTRIGLYCERIDEMIA: ICD-10-CM

## 2024-05-08 DIAGNOSIS — I10 PRIMARY HYPERTENSION: ICD-10-CM

## 2024-05-08 DIAGNOSIS — E66.01 MORBID OBESITY (H): ICD-10-CM

## 2024-05-08 DIAGNOSIS — F51.01 PRIMARY INSOMNIA: ICD-10-CM

## 2024-05-08 DIAGNOSIS — I10 PRIMARY HYPERTENSION: Primary | ICD-10-CM

## 2024-05-08 LAB
BASOPHILS # BLD AUTO: 0 10E3/UL (ref 0–0.2)
BASOPHILS NFR BLD AUTO: 0 %
EOSINOPHIL # BLD AUTO: 0.1 10E3/UL (ref 0–0.7)
EOSINOPHIL NFR BLD AUTO: 1 %
ERYTHROCYTE [DISTWIDTH] IN BLOOD BY AUTOMATED COUNT: 11.7 % (ref 10–15)
HCT VFR BLD AUTO: 46.2 % (ref 40–53)
HGB BLD-MCNC: 16 G/DL (ref 13.3–17.7)
IMM GRANULOCYTES # BLD: 0 10E3/UL
IMM GRANULOCYTES NFR BLD: 0 %
LYMPHOCYTES # BLD AUTO: 1.5 10E3/UL (ref 0.8–5.3)
LYMPHOCYTES NFR BLD AUTO: 31 %
MCH RBC QN AUTO: 33.9 PG (ref 26.5–33)
MCHC RBC AUTO-ENTMCNC: 34.6 G/DL (ref 31.5–36.5)
MCV RBC AUTO: 98 FL (ref 78–100)
MONOCYTES # BLD AUTO: 0.5 10E3/UL (ref 0–1.3)
MONOCYTES NFR BLD AUTO: 11 %
NEUTROPHILS # BLD AUTO: 2.7 10E3/UL (ref 1.6–8.3)
NEUTROPHILS NFR BLD AUTO: 57 %
PLATELET # BLD AUTO: 195 10E3/UL (ref 150–450)
RBC # BLD AUTO: 4.72 10E6/UL (ref 4.4–5.9)
WBC # BLD AUTO: 4.8 10E3/UL (ref 4–11)

## 2024-05-08 PROCEDURE — 36415 COLL VENOUS BLD VENIPUNCTURE: CPT | Performed by: FAMILY MEDICINE

## 2024-05-08 PROCEDURE — 85025 COMPLETE CBC W/AUTO DIFF WBC: CPT | Mod: QW | Performed by: FAMILY MEDICINE

## 2024-05-08 PROCEDURE — 99214 OFFICE O/P EST MOD 30 MIN: CPT | Performed by: FAMILY MEDICINE

## 2024-05-08 RX ORDER — TRAZODONE HYDROCHLORIDE 50 MG/1
150 TABLET, FILM COATED ORAL AT BEDTIME
Qty: 90 TABLET | Refills: 9 | Status: SHIPPED | OUTPATIENT
Start: 2024-05-08

## 2024-05-08 NOTE — LETTER
May 8, 2024      Armand Crystal   [facility] [id number]        Dear ,    We are writing to inform you of your test results.    Your test results fall within the expected range(s) or remain unchanged from previous results.  Please continue with current treatment plan.    Resulted Orders   CBC with platelets and differential   Result Value Ref Range    WBC Count 4.8 4.0 - 11.0 10e3/uL    RBC Count 4.72 4.40 - 5.90 10e6/uL    Hemoglobin 16.0 13.3 - 17.7 g/dL    Hematocrit 46.2 40.0 - 53.0 %    MCV 98 78 - 100 fL    MCH 33.9 (H) 26.5 - 33.0 pg    MCHC 34.6 31.5 - 36.5 g/dL    RDW 11.7 10.0 - 15.0 %    Platelet Count 195 150 - 450 10e3/uL    % Neutrophils 57 %    % Lymphocytes 31 %    % Monocytes 11 %    % Eosinophils 1 %    % Basophils 0 %    % Immature Granulocytes 0 %    Absolute Neutrophils 2.7 1.6 - 8.3 10e3/uL    Absolute Lymphocytes 1.5 0.8 - 5.3 10e3/uL    Absolute Monocytes 0.5 0.0 - 1.3 10e3/uL    Absolute Eosinophils 0.1 0.0 - 0.7 10e3/uL    Absolute Basophils 0.0 0.0 - 0.2 10e3/uL    Absolute Immature Granulocytes 0.0 <=0.4 10e3/uL       If you have any questions or concerns, please call the clinic at the number listed above.       Sincerely,      Samuel Zacarias MD

## 2024-05-08 NOTE — PROGRESS NOTES
"  Assessment & Plan     Primary hypertension  Under good control with hydrochlorothiazide losartan and amlodipine    Hypertriglyceridemia  Controlled with atorvastatin    Primary insomnia  Will increase his trazodone  - traZODone (DESYREL) 50 MG tablet; Take 3 tablets (150 mg) by mouth at bedtime    Elevated MCV  Likely related to his alcohol his B12 was low normal will start oral B12.  - CBC with Platelets & Differential            BMI  Estimated body mass index is 35.36 kg/m  as calculated from the following:    Height as of this encounter: 1.854 m (6' 1\").    Weight as of this encounter: 121.6 kg (268 lb).             Dayami Acuna is a 69 year old, presenting for the following health issues: Overall doing well.  Troubles with sleep yet even with the trazodone.  Still drinks a bit too much is independently with his [place]  Chronic Disease Management (Refills. Trazodone not working for sleep.)      5/8/2024    11:19 AM   Additional Questions   Roomed by judy   Accompanied by n/a     History of Present Illness       Hyperlipidemia:  He presents for follow up of hyperlipidemia.   He is taking medication to lower cholesterol. He is not having myalgia or other side effects to statin medications.    Hypertension: He presents for follow up of hypertension.  He does not check blood pressure  regularly outside of the clinic. Outpatient blood pressures have not been over 140/90. He follows a low salt diet.     Reason for visit:  Med refills    He eats 2-3 servings of fruits and vegetables daily.He consumes 0 sweetened beverage(s) daily.   He is taking medications regularly.           There is no problem list on file for this patient.    Current Outpatient Medications   Medication Sig Dispense Refill    amLODIPine (NORVASC) 10 MG tablet Take 1 tablet (10 mg) by mouth daily 90 tablet 1    atorvastatin (LIPITOR) 20 MG tablet Take 1 tablet (20 mg) by mouth daily 90 tablet 1    " "azelastine (ASTELIN) 0.1 % nasal spray 2 sprays each nostril 1-2x daily as needed for nasal congestion (use nightly for first 2 week) 30 mL 11    cyclobenzaprine (FLEXERIL) 10 MG tablet Take 1 tablet (10 mg) by mouth 2 times daily as needed for muscle spasms 30 tablet 11    hydrochlorothiazide (HYDRODIURIL) 25 MG tablet Take 1 tablet (25 mg) by mouth daily 90 tablet 1    losartan (COZAAR) 100 MG tablet Take 1 tablet (100 mg) by mouth daily 90 tablet 1    potassium chloride ER (KLOR-CON M) 20 MEQ CR tablet Take 1 tablet (20 mEq) by mouth 2 times daily 90 tablet 3    traZODone (DESYREL) 50 MG tablet Take 3 tablets (150 mg) by mouth at bedtime 90 tablet 9     No current facility-administered medications for this visit.   \      Review of Systems  Constitutional, HEENT, cardiovascular, pulmonary, gi and gu systems are negative, except as otherwise noted.      Objective    /76 (BP Location: Right arm, Patient Position: Sitting)   Pulse 75   Temp 98.8  F (37.1  C) (Tympanic)   Resp 16   Ht 1.854 m (6' 1\")   Wt 121.6 kg (268 lb)   SpO2 95%   BMI 35.36 kg/m    Body mass index is 35.36 kg/m .  Physical Exam   GENERAL: alert and no distress  NECK: no adenopathy, no asymmetry, masses, or scars  RESP: lungs clear to auscultation - no rales, rhonchi or wheezes  CV: regular rate and rhythm, normal S1 S2, no S3 or S4, no murmur, click or rub, no peripheral edema  ABDOMEN: soft, nontender, no hepatosplenomegaly, no masses and bowel sounds normal  MS: no gross musculoskeletal defects noted, no edema            Signed Electronically by: Samuel Zacarias MD    "

## 2024-05-09 ENCOUNTER — MYC MEDICAL ADVICE (OUTPATIENT)
Dept: FAMILY MEDICINE | Facility: CLINIC | Age: 69
End: 2024-05-09
Payer: COMMERCIAL

## 2024-05-09 ENCOUNTER — MYC REFILL (OUTPATIENT)
Dept: FAMILY MEDICINE | Facility: CLINIC | Age: 69
End: 2024-05-09
Payer: COMMERCIAL

## 2024-05-09 DIAGNOSIS — I10 PRIMARY HYPERTENSION: ICD-10-CM

## 2024-05-09 DIAGNOSIS — E78.1 HYPERTRIGLYCERIDEMIA: ICD-10-CM

## 2024-05-09 RX ORDER — HYDROCHLOROTHIAZIDE 25 MG/1
25 TABLET ORAL DAILY
Qty: 90 TABLET | Refills: 1 | Status: SHIPPED | OUTPATIENT
Start: 2024-05-09

## 2024-05-09 RX ORDER — ATORVASTATIN CALCIUM 20 MG/1
20 TABLET, FILM COATED ORAL DAILY
Qty: 90 TABLET | Refills: 1 | OUTPATIENT
Start: 2024-05-09

## 2024-05-09 RX ORDER — AMLODIPINE BESYLATE 10 MG/1
10 TABLET ORAL DAILY
Qty: 90 TABLET | Refills: 1 | Status: SHIPPED | OUTPATIENT
Start: 2024-05-09

## 2024-05-09 RX ORDER — LOSARTAN POTASSIUM 100 MG/1
100 TABLET ORAL DAILY
Qty: 90 TABLET | Refills: 1 | Status: SHIPPED | OUTPATIENT
Start: 2024-05-09

## 2024-05-09 RX ORDER — ATORVASTATIN CALCIUM 20 MG/1
20 TABLET, FILM COATED ORAL DAILY
Qty: 90 TABLET | Refills: 1 | Status: SHIPPED | OUTPATIENT
Start: 2024-05-09

## 2024-05-09 RX ORDER — LOSARTAN POTASSIUM 100 MG/1
100 TABLET ORAL DAILY
Qty: 90 TABLET | Refills: 1 | OUTPATIENT
Start: 2024-05-09

## 2024-05-09 NOTE — TELEPHONE ENCOUNTER
Prescription approved per [facility] Refill Protocol.    Last Written Prescription Date:  11/8/23  Last Fill Quantity: 90,  # refills: 1   Last office visit: 5/8/2024

## 2024-05-30 ENCOUNTER — TRANSFERRED RECORDS (OUTPATIENT)
Dept: HEALTH INFORMATION MANAGEMENT | Facility: CLINIC | Age: 69
End: 2024-05-30
Payer: COMMERCIAL

## 2024-06-04 ENCOUNTER — TRANSFERRED RECORDS (OUTPATIENT)
Dept: HEALTH INFORMATION MANAGEMENT | Facility: CLINIC | Age: 69
End: 2024-06-04
Payer: COMMERCIAL

## 2024-06-18 ENCOUNTER — TRANSFERRED RECORDS (OUTPATIENT)
Dept: HEALTH INFORMATION MANAGEMENT | Facility: CLINIC | Age: 69
End: 2024-06-18
Payer: COMMERCIAL

## 2024-06-26 ENCOUNTER — TRANSFERRED RECORDS (OUTPATIENT)
Dept: HEALTH INFORMATION MANAGEMENT | Facility: CLINIC | Age: 69
End: 2024-06-26
Payer: COMMERCIAL

## 2024-07-05 ENCOUNTER — MYC REFILL (OUTPATIENT)
Dept: FAMILY MEDICINE | Facility: CLINIC | Age: 69
End: 2024-07-05
Payer: COMMERCIAL

## 2024-07-05 DIAGNOSIS — I10 PRIMARY HYPERTENSION: ICD-10-CM

## 2024-07-05 RX ORDER — POTASSIUM CHLORIDE 1500 MG/1
20 TABLET, EXTENDED RELEASE ORAL 2 TIMES DAILY
Qty: 90 TABLET | Refills: 3 | Status: SHIPPED | OUTPATIENT
Start: 2024-07-05

## 2024-07-05 NOTE — TELEPHONE ENCOUNTER
Prescription approved per [facility] Refill Protocol.    Last Written Prescription Date:  11/9/23  Last Fill Quantity: 180,  # refills: 3   Last office visit: 5/8/2024

## 2024-07-27 ENCOUNTER — HEALTH MAINTENANCE LETTER (OUTPATIENT)
Age: 69
End: 2024-07-27

## 2024-08-08 ENCOUNTER — TRANSFERRED RECORDS (OUTPATIENT)
Dept: HEALTH INFORMATION MANAGEMENT | Facility: CLINIC | Age: 69
End: 2024-08-08
Payer: COMMERCIAL

## 2024-11-01 DIAGNOSIS — E78.1 HYPERTRIGLYCERIDEMIA: ICD-10-CM

## 2024-11-01 DIAGNOSIS — I10 PRIMARY HYPERTENSION: ICD-10-CM

## 2024-11-01 RX ORDER — LOSARTAN POTASSIUM 100 MG/1
100 TABLET ORAL DAILY
Qty: 90 TABLET | Refills: 1 | Status: SHIPPED | OUTPATIENT
Start: 2024-11-01 | End: 2024-11-05

## 2024-11-01 RX ORDER — ATORVASTATIN CALCIUM 20 MG/1
20 TABLET, FILM COATED ORAL DAILY
Qty: 90 TABLET | Refills: 1 | Status: SHIPPED | OUTPATIENT
Start: 2024-11-01 | End: 2024-11-05

## 2024-11-01 RX ORDER — HYDROCHLOROTHIAZIDE 25 MG/1
25 TABLET ORAL DAILY
Qty: 90 TABLET | Refills: 1 | Status: SHIPPED | OUTPATIENT
Start: 2024-11-01 | End: 2024-11-05

## 2024-11-01 RX ORDER — AMLODIPINE BESYLATE 10 MG/1
10 TABLET ORAL DAILY
Qty: 90 TABLET | Refills: 1 | Status: SHIPPED | OUTPATIENT
Start: 2024-11-01 | End: 2024-11-05

## 2024-11-06 SDOH — HEALTH STABILITY: PHYSICAL HEALTH: ON AVERAGE, HOW MANY MINUTES DO YOU ENGAGE IN EXERCISE AT THIS LEVEL?: 0 MIN

## 2024-11-06 SDOH — HEALTH STABILITY: PHYSICAL HEALTH: ON AVERAGE, HOW MANY DAYS PER WEEK DO YOU ENGAGE IN MODERATE TO STRENUOUS EXERCISE (LIKE A BRISK WALK)?: 0 DAYS

## 2024-11-06 ASSESSMENT — SOCIAL DETERMINANTS OF HEALTH (SDOH): HOW OFTEN DO YOU GET TOGETHER WITH FRIENDS OR RELATIVES?: TWICE A WEEK

## 2024-11-08 ENCOUNTER — OFFICE VISIT (OUTPATIENT)
Dept: FAMILY MEDICINE | Facility: CLINIC | Age: 69
End: 2024-11-08
Payer: COMMERCIAL

## 2024-11-08 VITALS
DIASTOLIC BLOOD PRESSURE: 85 MMHG | HEART RATE: 71 BPM | TEMPERATURE: 97.4 F | HEIGHT: 73 IN | OXYGEN SATURATION: 94 % | SYSTOLIC BLOOD PRESSURE: 136 MMHG | RESPIRATION RATE: 16 BRPM | BODY MASS INDEX: 35.65 KG/M2 | WEIGHT: 269 LBS

## 2024-11-08 DIAGNOSIS — E78.1 HYPERTRIGLYCERIDEMIA: ICD-10-CM

## 2024-11-08 DIAGNOSIS — Z00.00 MEDICARE ANNUAL WELLNESS VISIT, SUBSEQUENT: Primary | ICD-10-CM

## 2024-11-08 DIAGNOSIS — R74.8 ELEVATED LIVER ENZYMES: ICD-10-CM

## 2024-11-08 DIAGNOSIS — F51.01 PRIMARY INSOMNIA: ICD-10-CM

## 2024-11-08 DIAGNOSIS — J31.0 CHRONIC RHINITIS: ICD-10-CM

## 2024-11-08 DIAGNOSIS — L08.9 LOCAL INFECTION OF SKIN AND SUBCUTANEOUS TISSUE: ICD-10-CM

## 2024-11-08 DIAGNOSIS — E66.01 MORBID OBESITY (H): ICD-10-CM

## 2024-11-08 DIAGNOSIS — I10 PRIMARY HYPERTENSION: ICD-10-CM

## 2024-11-08 DIAGNOSIS — R21 RASH: ICD-10-CM

## 2024-11-08 DIAGNOSIS — Z11.59 ENCOUNTER FOR SCREENING FOR OTHER VIRAL DISEASES: ICD-10-CM

## 2024-11-08 LAB
BASOPHILS # BLD AUTO: 0 10E3/UL (ref 0–0.2)
BASOPHILS NFR BLD AUTO: 0 %
EOSINOPHIL # BLD AUTO: 0.1 10E3/UL (ref 0–0.7)
EOSINOPHIL NFR BLD AUTO: 2 %
ERYTHROCYTE [DISTWIDTH] IN BLOOD BY AUTOMATED COUNT: 12.2 % (ref 10–15)
HCT VFR BLD AUTO: 47 % (ref 40–53)
HGB BLD-MCNC: 15.9 G/DL (ref 13.3–17.7)
IMM GRANULOCYTES # BLD: 0 10E3/UL
IMM GRANULOCYTES NFR BLD: 0 %
LYMPHOCYTES # BLD AUTO: 1.3 10E3/UL (ref 0.8–5.3)
LYMPHOCYTES NFR BLD AUTO: 25 %
MCH RBC QN AUTO: 34.2 PG (ref 26.5–33)
MCHC RBC AUTO-ENTMCNC: 33.8 G/DL (ref 31.5–36.5)
MCV RBC AUTO: 101 FL (ref 78–100)
MONOCYTES # BLD AUTO: 0.6 10E3/UL (ref 0–1.3)
MONOCYTES NFR BLD AUTO: 12 %
NEUTROPHILS # BLD AUTO: 3 10E3/UL (ref 1.6–8.3)
NEUTROPHILS NFR BLD AUTO: 60 %
PLATELET # BLD AUTO: 215 10E3/UL (ref 150–450)
RBC # BLD AUTO: 4.65 10E6/UL (ref 4.4–5.9)
WBC # BLD AUTO: 5.1 10E3/UL (ref 4–11)

## 2024-11-08 PROCEDURE — 36415 COLL VENOUS BLD VENIPUNCTURE: CPT | Performed by: FAMILY MEDICINE

## 2024-11-08 PROCEDURE — 85025 COMPLETE CBC W/AUTO DIFF WBC: CPT | Mod: QW | Performed by: FAMILY MEDICINE

## 2024-11-08 PROCEDURE — 80061 LIPID PANEL: CPT | Performed by: FAMILY MEDICINE

## 2024-11-08 PROCEDURE — 83735 ASSAY OF MAGNESIUM: CPT | Performed by: FAMILY MEDICINE

## 2024-11-08 PROCEDURE — 99214 OFFICE O/P EST MOD 30 MIN: CPT | Mod: 25 | Performed by: FAMILY MEDICINE

## 2024-11-08 PROCEDURE — G0438 PPPS, INITIAL VISIT: HCPCS | Performed by: FAMILY MEDICINE

## 2024-11-08 PROCEDURE — 80053 COMPREHEN METABOLIC PANEL: CPT | Performed by: FAMILY MEDICINE

## 2024-11-08 RX ORDER — TRAZODONE HYDROCHLORIDE 50 MG/1
150 TABLET, FILM COATED ORAL AT BEDTIME
Qty: 90 TABLET | Refills: 9 | Status: SHIPPED | OUTPATIENT
Start: 2024-11-08

## 2024-11-08 RX ORDER — AZELASTINE 1 MG/ML
SPRAY, METERED NASAL
Qty: 30 ML | Refills: 11 | Status: SHIPPED | OUTPATIENT
Start: 2024-11-08

## 2024-11-08 RX ORDER — HYDROCHLOROTHIAZIDE 25 MG/1
25 TABLET ORAL DAILY
Qty: 90 TABLET | Refills: 1 | Status: SHIPPED | OUTPATIENT
Start: 2024-11-08

## 2024-11-08 RX ORDER — AMLODIPINE BESYLATE 10 MG/1
10 TABLET ORAL DAILY
Qty: 90 TABLET | Refills: 1 | Status: SHIPPED | OUTPATIENT
Start: 2024-11-08

## 2024-11-08 RX ORDER — POTASSIUM CHLORIDE 1500 MG/1
20 TABLET, EXTENDED RELEASE ORAL 2 TIMES DAILY
Qty: 90 TABLET | Refills: 3 | Status: SHIPPED | OUTPATIENT
Start: 2024-11-08

## 2024-11-08 RX ORDER — CEPHALEXIN 500 MG/1
500 CAPSULE ORAL 2 TIMES DAILY
Qty: 20 CAPSULE | Refills: 1 | Status: SHIPPED | OUTPATIENT
Start: 2024-11-08

## 2024-11-08 RX ORDER — CLOTRIMAZOLE AND BETAMETHASONE DIPROPIONATE 10; .64 MG/G; MG/G
CREAM TOPICAL 2 TIMES DAILY
Qty: 45 G | Refills: 11 | Status: SHIPPED | OUTPATIENT
Start: 2024-11-08

## 2024-11-08 RX ORDER — LOSARTAN POTASSIUM 100 MG/1
100 TABLET ORAL DAILY
Qty: 90 TABLET | Refills: 1 | Status: SHIPPED | OUTPATIENT
Start: 2024-11-08

## 2024-11-08 RX ORDER — ATORVASTATIN CALCIUM 20 MG/1
20 TABLET, FILM COATED ORAL DAILY
Qty: 90 TABLET | Refills: 1 | Status: SHIPPED | OUTPATIENT
Start: 2024-11-08

## 2024-11-08 SDOH — HEALTH STABILITY: PHYSICAL HEALTH: ON AVERAGE, HOW MANY MINUTES DO YOU ENGAGE IN EXERCISE AT THIS LEVEL?: 0 MIN

## 2024-11-08 SDOH — HEALTH STABILITY: PHYSICAL HEALTH: ON AVERAGE, HOW MANY DAYS PER WEEK DO YOU ENGAGE IN MODERATE TO STRENUOUS EXERCISE (LIKE A BRISK WALK)?: 0 DAYS

## 2024-11-08 ASSESSMENT — SOCIAL DETERMINANTS OF HEALTH (SDOH): HOW OFTEN DO YOU GET TOGETHER WITH FRIENDS OR RELATIVES?: TWICE A WEEK

## 2024-11-08 NOTE — LETTER
November 9, 2024      Armand Crystal   [facility] [address]        Dear ,    We are writing to inform you of your test results.    Test results indicate you may require additional follow up, see comment below.  Your liver enzymes are a bit more elevated. I have ordered an ultrasound to look further at your liver. We will call you next week to schedule.  Please try to eliminate alcohol for a few weeks and recheck the lab test. You can set up a lab only visit at the clinic.    Resulted Orders   Comprehensive metabolic panel   Result Value Ref Range    Sodium 140 135 - 145 mmol/L    Potassium 4.0 3.4 - 5.3 mmol/L    Carbon Dioxide (CO2) 24 22 - 29 mmol/L    Anion Gap 14 7 - 15 mmol/L    Urea Nitrogen 13.5 8.0 - 23.0 mg/dL    Creatinine 0.96 0.67 - 1.17 mg/dL    GFR Estimate 86 >60 mL/min/1.73m2      Comment:      eGFR calculated using 2021 CKD-EPI equation.    Calcium 9.6 8.8 - 10.4 mg/dL      Comment:      Reference intervals for this test were updated on 7/16/2024 to reflect our healthy population more accurately. There may be differences in the flagging of prior results with similar values performed with this method. Those prior results can be interpreted in the context of the updated reference intervals.    Chloride 102 98 - 107 mmol/L    Glucose 121 (H) 70 - 99 mg/dL    Alkaline Phosphatase 68 40 - 150 U/L    AST 68 (H) 0 - 45 U/L    ALT 84 (H) 0 - 70 U/L    Protein Total 7.7 6.4 - 8.3 g/dL    Albumin 4.4 3.5 - 5.2 g/dL    Bilirubin Total 0.9 <=1.2 mg/dL    Patient Fasting > 8hrs? Yes    Lipid panel reflex to direct LDL Fasting   Result Value Ref Range    Cholesterol 132 <200 mg/dL    Triglycerides 102 <150 mg/dL    Direct Measure HDL 55 >=40 mg/dL    LDL Cholesterol Calculated 57 <100 mg/dL    Non HDL Cholesterol 77 <130 mg/dL    Patient Fasting > 8hrs? Yes     Narrative    Cholesterol  Desirable: < 200 mg/dL  Borderline High: 200 - 239 mg/dL  High: >= 240  mg/dL    Triglycerides  Normal: < 150 mg/dL  Borderline High: 150 - 199 mg/dL  High: 200-499 mg/dL  Very High: >= 500 mg/dL    Direct Measure HDL  Female: >= 50 mg/dL   Male: >= 40 mg/dL    LDL Cholesterol  Desirable: < 100 mg/dL  Above Desirable: 100 - 129 mg/dL   Borderline High: 130 - 159 mg/dL   High:  160 - 189 mg/dL   Very High: >= 190 mg/dL    Non HDL Cholesterol  Desirable: < 130 mg/dL  Above Desirable: 130 - 159 mg/dL  Borderline High: 160 - 189 mg/dL  High: 190 - 219 mg/dL  Very High: >= 220 mg/dL   Magnesium   Result Value Ref Range    Magnesium 1.9 1.7 - 2.3 mg/dL   CBC with platelets and differential   Result Value Ref Range    WBC Count 5.1 4.0 - 11.0 10e3/uL    RBC Count 4.65 4.40 - 5.90 10e6/uL    Hemoglobin 15.9 13.3 - 17.7 g/dL    Hematocrit 47.0 40.0 - 53.0 %     (H) 78 - 100 fL    MCH 34.2 (H) 26.5 - 33.0 pg    MCHC 33.8 31.5 - 36.5 g/dL    RDW 12.2 10.0 - 15.0 %    Platelet Count 215 150 - 450 10e3/uL    % Neutrophils 60 %    % Lymphocytes 25 %    % Monocytes 12 %    % Eosinophils 2 %    % Basophils 0 %    % Immature Granulocytes 0 %    Absolute Neutrophils 3.0 1.6 - 8.3 10e3/uL    Absolute Lymphocytes 1.3 0.8 - 5.3 10e3/uL    Absolute Monocytes 0.6 0.0 - 1.3 10e3/uL    Absolute Eosinophils 0.1 0.0 - 0.7 10e3/uL    Absolute Basophils 0.0 0.0 - 0.2 10e3/uL    Absolute Immature Granulocytes 0.0 <=0.4 10e3/uL       If you have any questions or concerns, please call the clinic at the number listed above.       Sincerely,      Samuel Zacarias MD

## 2024-11-08 NOTE — PROGRESS NOTES
Preventive Care Visit  [facility]  Samuel Zacarias MD, Family Medicine  Nov 8, 2024      Assessment & Plan     Medicare annual wellness visit, subsequent  Healthcare maintenance and advance directives reviewed    Primary hypertension  Under reasonable control with meds as below  - amLODIPine (NORVASC) 10 MG tablet; Take 1 tablet (10 mg) by mouth daily.  - hydrochlorothiazide (HYDRODIURIL) 25 MG tablet; Take 1 tablet (25 mg) by mouth daily.  - losartan (COZAAR) 100 MG tablet; Take 1 tablet (100 mg) by mouth daily.  - potassium chloride lidia ER (KLOR-CON M20) 20 MEQ CR tablet; Take 1 tablet (20 mEq) by mouth 2 times daily.  - CBC with Platelets & Differential; Future  - Comprehensive metabolic panel; Future  - Magnesium; Future    Hypertriglyceridemia  Under reasonable control with Lipitor  - atorvastatin (LIPITOR) 20 MG tablet; Take 1 tablet (20 mg) by mouth daily.  - Lipid panel reflex to direct LDL Fasting; Future    Morbid obesity (H)  Unchanged    Chronic rhinitis  Controlled with Astelin  - azelastine (ASTELIN) 0.1 % nasal spray; 2 sprays each nostril 1-2x daily as needed for nasal congestion (use nightly for first 2 week)    Primary insomnia  Trazodone at bedtime  - traZODone (DESYREL) 50 MG tablet; Take 3 tablets (150 mg) by mouth at bedtime.    Rash  Recurrent groin rash will renew his cream that he uses as needed  - clotrimazole-betamethasone (LOTRISONE) 1-0.05 % external cream; Apply topically 2 times daily.    Local infection of skin and subcutaneous tissue  Overall healing but I am concerned over underlying infection certainly there may be some particulate woody foreign body left behind.  Will look at antibiotics warm compresses and time.  Follow-up in few weeks if not continuing to resolve sooner if worse  - cephALEXin (KEFLEX) 500 MG capsule; Take 1 capsule (500 mg) by mouth 2 times daily.            BMI  Estimated body mass index is 35.49 kg/m  as calculated from the  "following:    Height as of this encounter: 1.854 m (6' 1\").    Weight as of this encounter: 122 kg (269 lb).       Counseling  Appropriate preventive services were addressed with this patient via screening, questionnaire, or discussion as appropriate for fall prevention, nutrition, physical activity, Tobacco-use cessation, social engagement, weight loss and cognition.  Checklist reviewing preventive services available has been given to the patient.  Reviewed patient's diet, addressing concerns and/or questions.   He is at risk for psychosocial distress and has been provided with information to reduce risk.   The patient reports drinking more than 3 alcoholic drinks per day and/or more than 7 drhnks per week. The patient was counseled and given information about possible harmful effects of excessive alcohol intake.Patient reported safety concerns were addressed today.        Dayami Acuna is a 69 year old, presenting for the following: Well.  [place]  Enjoying their adult children and grandchildren.  Avid janiya.  He did accidentally walk into a stick a couple weeks ago which punctured his anterior tibia on the left.  They cleaned it well and has been keeping it clean.  He notes he still some swelling.  No significant pain.  Occasional pussy discharge when he picks the scab off.  Medicare Visit        11/8/2024     1:05 PM   Additional Questions   Roomed by Thuy ANN       [facility]          Health Care Directive  Patient does not have a Health Care Directive: Discussed advance care planning with patient; however, patient declined at this time.      11/8/2024   General Health   How would you rate your overall physical health? Good   Feel stress (tense, anxious, or unable to sleep) Only a little      (!) STRESS CONCERN      11/8/2024   Nutrition   Diet: Regular (no restrictions)            11/8/2024   Exercise   Days per week of moderate/strenous exercise 0 days "   Average minutes spent exercising at this level 0 min      (!) EXERCISE CONCERN      11/8/2024   Social Factors   Frequency of gathering with friends or relatives Twice a week   Worry food won't last until get money to buy more No   Food not last or not have enough money for food? No   Do you have housing? (Housing is defined as stable permanent housing and does not include staying ouside in a car, in a tent, in an abandoned building, in an overnight shelter, or couch-surfing.) Yes   Are you worried about losing your housing? No   Lack of transportation? No   Unable to get utilities (heat,electricity)? No            11/8/2024   Fall Risk   Fallen 2 or more times in the past year? No    Trouble with walking or balance? No        Patient-reported          11/8/2024   Activities of Daily Living- Home Safety   Needs help with the following daily activites None of the above   Safety concerns in the home No grab bars in the bathroom            11/8/2024   Dental   Dentist two times every year? Yes            11/8/2024   Hearing Screening   Hearing concerns? None of the above            11/8/2024   Driving Risk Screening   Patient/family members have concerns about driving No            11/8/2024   General Alertness/Fatigue Screening   Have you been more tired than usual lately? No            11/8/2024   Urinary Incontinence Screening   Bothered by leaking urine in past 6 months No               Today's PHQ-2 Score:       11/8/2024     9:17 AM   PHQ-2 ( 1999 [employer])   Q1: Little interest or pleasure in doing things 0    Q2: Feeling down, depressed or hopeless 0    PHQ-2 Score 0    Q1: Little interest or pleasure in doing things Not at all   Q2: Feeling down, depressed or hopeless Not at all   PHQ-2 Score 0       Patient-reported           11/8/2024   Substance Use   Alcohol more than 3/day or more than 7/wk Yes   How often do you have a drink containing alcohol 4 or more times a week   How many alcohol drinks on typical  "day 5 or 6   How often do you have 5+ drinks at one occasion Weekly   Audit 2/3 Score 5   How often not able to stop drinking once started Never   How often failed to do what normally expected Never   How often needed first drink in am after a heavy drinking session Never   How often feeling of guilt or remorse after drinking Never   How often unable to remember what happened the night before Never   Have you or someone else been injured because of your drinking No   Has anyone been concerned or suggested you cut down on drinking No   TOTAL SCORE - AUDIT 9   Do you have a current opioid prescription? No   How severe/bad is pain from 1 to 10? 3/10   Do you use any other substances recreationally? No        Social History     Tobacco Use    Smoking status: Former     Current packs/day: 0.00     Types: Cigarettes     Quit date: 1981     Years since quittin.8     Passive exposure: Past    Smokeless tobacco: Never   Vaping Use    Vaping status: Never Used   Substance Use Topics    Alcohol use: Yes     Comment: few drinks per day    Drug use: Never       Last PSA: No results found for: \"PSA\"  ASCVD Risk   The 10-year ASCVD risk score (Matheus CAIN, et al., 2019) is: 15%    Values used to calculate the score:      Age: 69 years      Sex: Male      Is Non- : No      Diabetic: No      Tobacco smoker: No      Systolic Blood Pressure: 136 mmHg      Is BP treated: Yes      HDL Cholesterol: 74 mg/dL      Total Cholesterol: 141 mg/dL            Reviewed and updated as needed this visit by Provider                      Current providers sharing in care for this patient include:  Patient Care Team:  Samuel Zacarias MD as PCP - General (Family Medicine)  Samuel Zacarias MD as Assigned PCP  Abimael Mullins MD as Assigned Surgical Provider    The following health maintenance items are reviewed in Epic and correct as of today:  Health Maintenance   Topic Date Due    ADVANCE CARE PLANNING  " "Never done    HEPATITIS C SCREENING  Never done    MEDICARE ANNUAL WELLNESS VISIT  Never done    Pneumococcal Vaccine: 65+ Years (2 of 2 - PPSV23 or PCV20) 07/21/2021    INFLUENZA VACCINE (1) Never done    COVID-19 Vaccine (5 - 2024-25 season) 09/01/2024    BMP  11/08/2024    LIPID  11/08/2024    ANNUAL REVIEW OF HM ORDERS  11/08/2024    FALL RISK ASSESSMENT  11/08/2025    COLORECTAL CANCER SCREENING  05/08/2026    GLUCOSE  11/08/2026    DTAP/TDAP/TD IMMUNIZATION (4 - Td or Tdap) 05/15/2034    PHQ-2 (once per calendar year)  Completed    ZOSTER IMMUNIZATION  Completed    RSV VACCINE  Completed    AORTIC ANEURYSM SCREENING (SYSTEM ASSIGNED)  Completed    HPV IMMUNIZATION  Aged Out    MENINGITIS IMMUNIZATION  Aged Out    RSV MONOCLONAL ANTIBODY  Aged Out         Review of Systems  Constitutional, HEENT, cardiovascular, pulmonary, gi and gu systems are negative, except as otherwise noted.     Objective    Exam  /85   Pulse 71   Temp 97.4  F (36.3  C)   Resp 16   Ht 1.854 m (6' 1\")   Wt 122 kg (269 lb)   SpO2 94%   BMI 35.49 kg/m     Estimated body mass index is 35.49 kg/m  as calculated from the following:    Height as of this encounter: 1.854 m (6' 1\").    Weight as of this encounter: 122 kg (269 lb).    Physical Exam  GENERAL: alert and no distress  NECK: no adenopathy, no asymmetry, masses, or scars  RESP: lungs clear to auscultation - no rales, rhonchi or wheezes  CV: regular rate and rhythm, normal S1 S2, no S3 or S4, no murmur, click or rub, no peripheral edema  ABDOMEN: soft, nontender, no hepatosplenomegaly, no masses and bowel sounds normal  MS: Patient's left leg reveals the 2 cm scabbed area over the lower anterior tibia.  There is mild swelling some increased warmth no redness no discharge.  He does have some mild swelling of both lower extremities which is chronic.  Lower extremity CMS otherwise intact        11/8/2024   Mini Cog   Mini-Cog Not Completed (choose reason) Patient declines    "       Patient declines, there are NO concerns for cognitive deficits.           Signed Electronically by: Samuel Zacarias MD

## 2024-11-09 LAB
ALBUMIN SERPL BCG-MCNC: 4.4 G/DL (ref 3.5–5.2)
ALP SERPL-CCNC: 68 U/L (ref 40–150)
ALT SERPL W P-5'-P-CCNC: 84 U/L (ref 0–70)
ANION GAP SERPL CALCULATED.3IONS-SCNC: 14 MMOL/L (ref 7–15)
AST SERPL W P-5'-P-CCNC: 68 U/L (ref 0–45)
BILIRUB SERPL-MCNC: 0.9 MG/DL
BUN SERPL-MCNC: 13.5 MG/DL (ref 8–23)
CALCIUM SERPL-MCNC: 9.6 MG/DL (ref 8.8–10.4)
CHLORIDE SERPL-SCNC: 102 MMOL/L (ref 98–107)
CHOLEST SERPL-MCNC: 132 MG/DL
CREAT SERPL-MCNC: 0.96 MG/DL (ref 0.67–1.17)
EGFRCR SERPLBLD CKD-EPI 2021: 86 ML/MIN/1.73M2
FASTING STATUS PATIENT QL REPORTED: YES
FASTING STATUS PATIENT QL REPORTED: YES
GLUCOSE SERPL-MCNC: 121 MG/DL (ref 70–99)
HCO3 SERPL-SCNC: 24 MMOL/L (ref 22–29)
HDLC SERPL-MCNC: 55 MG/DL
LDLC SERPL CALC-MCNC: 57 MG/DL
MAGNESIUM SERPL-MCNC: 1.9 MG/DL (ref 1.7–2.3)
NONHDLC SERPL-MCNC: 77 MG/DL
POTASSIUM SERPL-SCNC: 4 MMOL/L (ref 3.4–5.3)
PROT SERPL-MCNC: 7.7 G/DL (ref 6.4–8.3)
SODIUM SERPL-SCNC: 140 MMOL/L (ref 135–145)
TRIGL SERPL-MCNC: 102 MG/DL

## 2024-11-15 ENCOUNTER — HOSPITAL ENCOUNTER (OUTPATIENT)
Dept: ULTRASOUND IMAGING | Facility: CLINIC | Age: 69
Discharge: HOME | End: 2024-11-15
Attending: FAMILY MEDICINE | Admitting: FAMILY MEDICINE
Payer: COMMERCIAL

## 2024-11-15 DIAGNOSIS — R74.8 ELEVATED LIVER ENZYMES: ICD-10-CM

## 2024-11-15 PROCEDURE — 76705 ECHO EXAM OF ABDOMEN: CPT

## 2024-11-18 DIAGNOSIS — G89.29 CHRONIC LOW BACK PAIN, UNSPECIFIED BACK PAIN LATERALITY, UNSPECIFIED WHETHER SCIATICA PRESENT: ICD-10-CM

## 2024-11-18 DIAGNOSIS — M54.50 CHRONIC LOW BACK PAIN, UNSPECIFIED BACK PAIN LATERALITY, UNSPECIFIED WHETHER SCIATICA PRESENT: ICD-10-CM

## 2024-11-18 RX ORDER — CYCLOBENZAPRINE HCL 10 MG
10 TABLET ORAL 2 TIMES DAILY PRN
Qty: 30 TABLET | Refills: 11 | Status: SHIPPED | OUTPATIENT
Start: 2024-11-18

## 2024-11-21 ENCOUNTER — TRANSFERRED RECORDS (OUTPATIENT)
Dept: HEALTH INFORMATION MANAGEMENT | Facility: CLINIC | Age: 69
End: 2024-11-21
Payer: COMMERCIAL

## 2025-03-12 ENCOUNTER — OFFICE VISIT (OUTPATIENT)
Dept: FAMILY MEDICINE | Facility: CLINIC | Age: 70
End: 2025-03-12
Payer: COMMERCIAL

## 2025-03-12 VITALS
HEART RATE: 65 BPM | SYSTOLIC BLOOD PRESSURE: 129 MMHG | BODY MASS INDEX: 35.56 KG/M2 | OXYGEN SATURATION: 93 % | DIASTOLIC BLOOD PRESSURE: 90 MMHG | RESPIRATION RATE: 16 BRPM | WEIGHT: 269.5 LBS | TEMPERATURE: 97.5 F

## 2025-03-12 DIAGNOSIS — B35.4 TINEA CORPORIS: ICD-10-CM

## 2025-03-12 DIAGNOSIS — L24.89 IRRITANT CONTACT DERMATITIS DUE TO OTHER AGENTS: Primary | ICD-10-CM

## 2025-03-12 PROBLEM — E66.9 OBESITY: Status: ACTIVE | Noted: 2024-05-08

## 2025-03-12 PROBLEM — E78.5 DYSLIPIDEMIA: Status: ACTIVE | Noted: 2025-03-12

## 2025-03-12 PROCEDURE — 99213 OFFICE O/P EST LOW 20 MIN: CPT | Performed by: STUDENT IN AN ORGANIZED HEALTH CARE EDUCATION/TRAINING PROGRAM

## 2025-03-12 PROCEDURE — 3079F DIAST BP 80-89 MM HG: CPT | Performed by: STUDENT IN AN ORGANIZED HEALTH CARE EDUCATION/TRAINING PROGRAM

## 2025-03-12 PROCEDURE — G2211 COMPLEX E/M VISIT ADD ON: HCPCS | Performed by: STUDENT IN AN ORGANIZED HEALTH CARE EDUCATION/TRAINING PROGRAM

## 2025-03-12 PROCEDURE — 3074F SYST BP LT 130 MM HG: CPT | Performed by: STUDENT IN AN ORGANIZED HEALTH CARE EDUCATION/TRAINING PROGRAM

## 2025-03-12 RX ORDER — KETOCONAZOLE 20 MG/G
CREAM TOPICAL
Qty: 30 G | Refills: 1 | Status: SHIPPED | OUTPATIENT
Start: 2025-03-12

## 2025-03-12 RX ORDER — TRIAMCINOLONE ACETONIDE 5 MG/G
OINTMENT TOPICAL
Qty: 15 G | Refills: 0 | Status: SHIPPED | OUTPATIENT
Start: 2025-03-12

## 2025-03-12 NOTE — PATIENT INSTRUCTIONS
PLAN:     For wrist - try stronger steroid - triamcinolone ointment - follow instructions on tube. Do not need to use the antibiotic or antifungal creams you have, just use this.   Reach out if not improving after a few days of this    For left hip - try different antifungal cream - ketoconazole. Can hold off using the Lotrisone while we're trying this treatment. Same thing, if not improving after a week or so, let me know

## 2025-03-12 NOTE — PROGRESS NOTES
Assessment & Plan     Irritant contact dermatitis due to other agents  Acute rash on dorsal right wrist consistent with contact dermatitis secondary to Band-Aid.  Will try short course of higher potency steroid given lack of response to hydrocortisone 1% and betamethasone 0.05%.  - triamcinolone (KENALOG) 0.5 % external ointment; Apply fingertip amount to right wrist rash twice daily for 7-10 days.    Tinea corporis  Left hip lesion consistent with tinea based on appearance and partial response to clotrimazole/betamethasone, history of tinea in the groin.  Also considering granuloma annulare, though this lesion has scale suggesting against this.  If it continues to persist despite ketoconazole which we are trying today, could consider KOH prep and/or biopsy.  Only mildly pruritic.  - ketoconazole (NIZORAL) 2 % external cream; Apply to left hip spot twice daily for 10-14 days.    Return precautions for both conditions discussed.      Dayami Acuna is a 69 year old, presenting for the following health issues:  Derm Problem (Rash on right wrist X 10 days that is weeping and itching. He thinks it's a reaction to the band aid. He hasn't used a band aid since Thursday. He states it is not getting any better. He states the rash started from a cut that happened last Monday when he was moving things.)        3/12/2025    10:12 AM   Additional Questions   Roomed by Nadya     History of Present Illness       Reason for visit:  Rash on wrist  Symptom onset:  3-7 days ago   He is taking medications regularly.          Rash  Onset/Duration: 10 days  Description  Location: Right wrist  Character: draining  Itching: moderate  Intensity:  moderate  Progression of Symptoms:  same  Accompanying signs and symptoms:   Fever: No  Body aches or joint pain: No  Sore throat symptoms: No  Recent cold symptoms: No  History:           Previous episodes of similar rash: None  New exposures:  None  Recent travel: No  Exposure to  similar rash: No  Precipitating or alleviating factors: Pt was cut  Therapies tried and outcome: hydrocortisone cream -  not effective. Used TID for several days      Tried another cream - Lotrisone  Also tried neosporin       Left hip/buttock  Not painful  Occasional itching  Never goes away, sometimes recedes          Objective    /90   Pulse 65   Temp 97.5  F (36.4  C) (Tympanic)   Resp 16   Wt 122.2 kg (269 lb 8 oz)   SpO2 93%   BMI 35.56 kg/m    Body mass index is 35.56 kg/m .  Physical Exam  Constitutional:       General: He is not in acute distress.     Appearance: Normal appearance. He is not ill-appearing.   HENT:      Nose: Nose normal.      Mouth/Throat:      Mouth: Mucous membranes are moist.   Eyes:      Conjunctiva/sclera: Conjunctivae normal.   Cardiovascular:      Rate and Rhythm: Normal rate.   Pulmonary:      Effort: Pulmonary effort is normal.   Skin:     General: Skin is warm.      Findings: Lesion (L hip/buttock w/ ~5-6cm ovular patch slightly raised, dry, slight border) and rash (erythematous, scabbed rash in shape of bandaid on dorsal R wrist w/out associated swelling or tenderness) present.   Neurological:      General: No focal deficit present.      Mental Status: He is alert.   Psychiatric:         Mood and Affect: Mood normal.         Behavior: Behavior normal.                Signed Electronically by: Kamille Gallagher MD

## 2025-03-19 ENCOUNTER — OFFICE VISIT (OUTPATIENT)
Dept: FAMILY MEDICINE | Facility: CLINIC | Age: 70
End: 2025-03-19
Payer: COMMERCIAL

## 2025-03-19 VITALS
SYSTOLIC BLOOD PRESSURE: 138 MMHG | RESPIRATION RATE: 16 BRPM | WEIGHT: 267 LBS | BODY MASS INDEX: 35.23 KG/M2 | DIASTOLIC BLOOD PRESSURE: 84 MMHG | HEART RATE: 64 BPM | TEMPERATURE: 97 F | OXYGEN SATURATION: 95 %

## 2025-03-19 DIAGNOSIS — B35.6 TINEA CRURIS: ICD-10-CM

## 2025-03-19 DIAGNOSIS — L30.9 DERMATITIS: ICD-10-CM

## 2025-03-19 DIAGNOSIS — L82.1 RAISED SEBORRHEIC KERATOSIS: ICD-10-CM

## 2025-03-19 DIAGNOSIS — L24.89 IRRITANT CONTACT DERMATITIS DUE TO OTHER AGENTS: ICD-10-CM

## 2025-03-19 DIAGNOSIS — L98.9 SKIN LESION OF LEFT LOWER LIMB: Primary | ICD-10-CM

## 2025-03-19 LAB
KOH PREPARATION: NORMAL
KOH PREPARATION: NORMAL

## 2025-03-19 PROCEDURE — 3079F DIAST BP 80-89 MM HG: CPT | Performed by: STUDENT IN AN ORGANIZED HEALTH CARE EDUCATION/TRAINING PROGRAM

## 2025-03-19 PROCEDURE — 99214 OFFICE O/P EST MOD 30 MIN: CPT | Mod: 25 | Performed by: STUDENT IN AN ORGANIZED HEALTH CARE EDUCATION/TRAINING PROGRAM

## 2025-03-19 PROCEDURE — 87220 TISSUE EXAM FOR FUNGI: CPT | Performed by: STUDENT IN AN ORGANIZED HEALTH CARE EDUCATION/TRAINING PROGRAM

## 2025-03-19 PROCEDURE — 3075F SYST BP GE 130 - 139MM HG: CPT | Performed by: STUDENT IN AN ORGANIZED HEALTH CARE EDUCATION/TRAINING PROGRAM

## 2025-03-19 PROCEDURE — 17110 DESTRUCTION B9 LES UP TO 14: CPT | Performed by: STUDENT IN AN ORGANIZED HEALTH CARE EDUCATION/TRAINING PROGRAM

## 2025-03-19 RX ORDER — TRIAMCINOLONE ACETONIDE 5 MG/G
OINTMENT TOPICAL
Qty: 15 G | Refills: 0 | Status: SHIPPED | OUTPATIENT
Start: 2025-03-19

## 2025-03-19 NOTE — PATIENT INSTRUCTIONS
PLAN for hip:  - test was negative for fungus  - STOP the ketoconazole cream, and do not use the Lotrisone on it either  - START the triamcinolone - apply no more than x2 per day for 10-14 days. Call or send message if not improving, and we will consider skin biopsy if needed. Need to be off all creams for 2 days prior to this.    For groin: OK to try ketoconazole cream on groin rash. Also recommend buying shampoo pictured below and using this on the groin rash x1/week to prevent it from coming back.    For your back - expect that it may scab and be slightly irritated for 1-2 weeks. Monitor, if it comes back and is irritating, can repeat freeze treatment. These spots do NOT turn in to cancer, and are not moles. See attacted info on seborrheic keratosis.

## 2025-03-19 NOTE — PROGRESS NOTES
Assessment & Plan   Problem List Items Addressed This Visit    None  Visit Diagnoses       Skin lesion of left lower limb    -  Primary    Relevant Medications    triamcinolone (KENALOG) 0.5 % external ointment    Other Relevant Orders    KOH prep (skin, hair or nails only) (Completed)    Dermatitis        Relevant Medications    triamcinolone (KENALOG) 0.5 % external ointment    Irritant contact dermatitis due to other agents        Relevant Medications    triamcinolone (KENALOG) 0.5 % external ointment    Tinea cruris        Relevant Medications    triamcinolone (KENALOG) 0.5 % external ointment    Raised seborrheic keratosis        Relevant Medications    triamcinolone (KENALOG) 0.5 % external ointment           Left hip lesion present for a few weeks to a few months, worsening despite topical ketoconazole and Lotrisone.  Does appear consistent with tinea. KOH prep today negative, though had not stopped antifungal prior to test.  Will treat as dermatitis with triamcinolone and if no improvement after 10 to 14 days of this, will consider biopsy.  Counseled on stopping steroid prior to biopsy.    For tinea cruris, okay to try topical ketoconazole, also recommend preventative ketoconazole wash weekly.    Irritant contact dermatitis on left wrist has improved with ketoconazole.    Raised seborrheic keratosis on back, this is associated with itching and occasionally catches on clothes, was treated with liquid nitrogen after risks and benefits were discussed with patient.  Anticipatory guidance provided.      I spent a total of 30 minutes on the day of the visit.   Time spent by me today doing chart review, history and exam, documentation and further activities per the note.        Dayami Acuna is a 69 year old, presenting for the following health issues:  Derm Problem (Rash on left hip that is getting worse. He is unsure how long it has been there. He states that it is itchy and the rash is getting bigger. He  states that this past fall he got appalled by a stick in his left chin, he is wondering if that's related.)        3/19/2025    11:03 AM   Additional Questions   Roomed by Stephanie-JEREMY     History of Present Illness       Reason for visit:  Rash on wrist  Symptom onset:  3-7 days ago   He is taking medications regularly.   Armand Crystal, 69 years    Skin rash  - Describes a rash that has been present for over a month  - Initially started as a small area, slightly larger than a nickel or quarter, and has now expanded to larger than a silver dollar  - Rash is itchy, wakes him up at night, and requires application of salve four times a day  - Uses a cream provided previously, which has helped clear up a different area  - Rash is raised and sometimes bleeds if scrubbed too hard  - Reports a separate rash in the groin area that has been present for years, comes and goes, and is more persistent in winter  - Groin rash can also be itchy and bleed, but not as severely as the other rash    Lifestyle  - Mentioned painting the bedroom is being done professionally due to his wife's shoulder issues preventing her from doing it herself    Rash  Onset/Duration: Several months  Description  Location: Left thigh  Character: raised  Itching: moderate  Intensity:  moderate  Progression of Symptoms:  worsening  Accompanying signs and symptoms:   Fever: No  Body aches or joint pain: No  Sore throat symptoms: No  Recent cold symptoms: No  History:           Previous episodes of similar rash: He states several years ago he had a rash on his back  New exposures:  None  Recent travel: No  Exposure to similar rash: No  Precipitating or alleviating factors: Unsure  Therapies tried and outcome: topical steroid - Kenalog. No improvement            Objective    /84   Pulse 64   Temp 97  F (36.1  C) (Tympanic)   Resp 16   Wt 121.1 kg (267 lb)   SpO2 95%   BMI 35.23 kg/m    Body mass index is 35.23 kg/m .  Physical Exam  Constitutional:        General: He is not in acute distress.     Appearance: Normal appearance. He is not ill-appearing.   HENT:      Nose: Nose normal.      Mouth/Throat:      Mouth: Mucous membranes are moist.   Eyes:      Conjunctiva/sclera: Conjunctivae normal.   Cardiovascular:      Rate and Rhythm: Normal rate.   Pulmonary:      Effort: Pulmonary effort is normal.   Skin:     General: Skin is warm.      Findings: Lesion present.      Comments: Left lateral hip with 6 x 4 cm raised, homogenously erythematous, slightly scaled skin lesion, with raised border, and associated erythema external to this border, and subcutaneous swelling.  Nontender.  Right groin line with beefy red rash consistent with Candida.  On central back, 1.5 cm raised pigmented SK with slight scaling   Neurological:      General: No focal deficit present.      Mental Status: He is alert.   Psychiatric:         Mood and Affect: Mood normal.         Behavior: Behavior normal.          Results for orders placed or performed in visit on 03/19/25 (from the past 24 hours)   KOH prep (skin, hair or nails only)    Specimen: Hip, Left; Skin   Result Value Ref Range    KOH Preparation No fungal elements seen     KOH Preparation Reference Range: No fungal elements seen.            Signed Electronically by: Kamille Gallagher MD

## 2025-03-26 ENCOUNTER — OFFICE VISIT (OUTPATIENT)
Dept: FAMILY MEDICINE | Facility: CLINIC | Age: 70
End: 2025-03-26
Payer: COMMERCIAL

## 2025-03-26 VITALS
WEIGHT: 265.2 LBS | BODY MASS INDEX: 34.99 KG/M2 | DIASTOLIC BLOOD PRESSURE: 88 MMHG | TEMPERATURE: 97.6 F | HEART RATE: 68 BPM | OXYGEN SATURATION: 97 % | RESPIRATION RATE: 16 BRPM | SYSTOLIC BLOOD PRESSURE: 153 MMHG

## 2025-03-26 DIAGNOSIS — L29.9 SEVERE PRURITUS: ICD-10-CM

## 2025-03-26 DIAGNOSIS — L30.9 DERMATITIS: ICD-10-CM

## 2025-03-26 DIAGNOSIS — B37.2 CANDIDAL INTERTRIGO: Primary | ICD-10-CM

## 2025-03-26 DIAGNOSIS — I10 PRIMARY HYPERTENSION: ICD-10-CM

## 2025-03-26 DIAGNOSIS — L24.89 IRRITANT CONTACT DERMATITIS DUE TO OTHER AGENTS: ICD-10-CM

## 2025-03-26 DIAGNOSIS — Z13.1 SCREENING FOR DIABETES MELLITUS: ICD-10-CM

## 2025-03-26 LAB
EST. AVERAGE GLUCOSE BLD GHB EST-MCNC: 111 MG/DL
HBA1C MFR BLD: 5.5 % (ref 0–5.6)

## 2025-03-26 PROCEDURE — 3077F SYST BP >= 140 MM HG: CPT | Performed by: STUDENT IN AN ORGANIZED HEALTH CARE EDUCATION/TRAINING PROGRAM

## 2025-03-26 PROCEDURE — 83036 HEMOGLOBIN GLYCOSYLATED A1C: CPT | Performed by: STUDENT IN AN ORGANIZED HEALTH CARE EDUCATION/TRAINING PROGRAM

## 2025-03-26 PROCEDURE — 99214 OFFICE O/P EST MOD 30 MIN: CPT | Performed by: STUDENT IN AN ORGANIZED HEALTH CARE EDUCATION/TRAINING PROGRAM

## 2025-03-26 PROCEDURE — 36415 COLL VENOUS BLD VENIPUNCTURE: CPT | Performed by: STUDENT IN AN ORGANIZED HEALTH CARE EDUCATION/TRAINING PROGRAM

## 2025-03-26 PROCEDURE — G2211 COMPLEX E/M VISIT ADD ON: HCPCS | Performed by: STUDENT IN AN ORGANIZED HEALTH CARE EDUCATION/TRAINING PROGRAM

## 2025-03-26 PROCEDURE — 3079F DIAST BP 80-89 MM HG: CPT | Performed by: STUDENT IN AN ORGANIZED HEALTH CARE EDUCATION/TRAINING PROGRAM

## 2025-03-26 RX ORDER — PREDNISONE 20 MG/1
40 TABLET ORAL DAILY
Qty: 6 TABLET | Refills: 0 | Status: SHIPPED | OUTPATIENT
Start: 2025-03-26 | End: 2025-03-29

## 2025-03-26 RX ORDER — FLUCONAZOLE 200 MG/1
200 TABLET ORAL WEEKLY
Qty: 4 TABLET | Refills: 0 | Status: SHIPPED | OUTPATIENT
Start: 2025-03-26 | End: 2025-04-17

## 2025-03-26 NOTE — PROGRESS NOTES
"  Assessment & Plan     Candidal intertrigo  Had unilateral groin line rash that previously was thought to be tinea cruris.  Rash now behaving like intertrigo.  Not improved despite topical clotrimazole, ketoconazole.  Will opt for oral therapy.  Also counseled on keeping area clean and dry, using absorbent powders and barrier cream as needed.  - fluconazole (DIFLUCAN) 200 MG tablet; Take 1 tablet (200 mg) by mouth once a week for 4 doses.    Severe pruritus  Unusually severe pruritus with rash, not improved with high potency steroid betamethasone (Lotrisone).  Advised to do very short course with pausing topical steroids.  Can try topical triamcinolone that he has at home once steroids are complete if pruritus comes back, but he will keep me updated.  - predniSONE (DELTASONE) 20 MG tablet; Take 2 tablets (40 mg) by mouth daily for 3 days.  - fluconazole (DIFLUCAN) 200 MG tablet; Take 1 tablet (200 mg) by mouth once a week for 4 doses.    Screening for diabetes mellitus  Unclear if prior glucose screenings on metabolic panels were fasting or not.  - Hemoglobin A1c; Future  - Hemoglobin A1c    Dermatitis  Circular erythematous pruritic lesion assessed last visit on 3/19, had worsened on topical antifungals (at that time) but is now improving on high potency triamcinolone.  Will continue to monitor.    Irritant contact dermatitis due to other agents  Irritant contact dermatitis from arm has resolved with topical corticosteroid.    Primary hypertension  Blood pressures high in the setting of irritant rash and poor sleep.  He will continue to monitor at home.        BMI  Estimated body mass index is 34.99 kg/m  as calculated from the following:    Height as of 11/8/24: 1.854 m (6' 1\").    Weight as of this encounter: 120.3 kg (265 lb 3.2 oz).       Subjective   Armand is a 69 year old, presenting for the following health issues:  Derm Problem ( Groin area getting worse and spreading. Pt states his left thigh is getting " better.)        3/26/2025    10:43 AM   Additional Questions   Roomed by Nadya     History of Present Illness       Reason for visit:  Follow up    He eats 0-1 servings of fruits and vegetables daily.He consumes 0 sweetened beverage(s) daily.He exercises with enough effort to increase his heart rate 9 or less minutes per day.  He exercises with enough effort to increase his heart rate 3 or less days per week.   He is taking medications regularly.        Groin rash changing   Never itched before, now very itchy  Spreading  Now both sides, before was just right side  On scrotum    No new exercise habits, no change in bathing habits. Showers daily.  No new detergents, shampoos or creams.   No issues going to the bathroom    Groin rash  Lotrisone was used PRN for long time  Rash worsened while on ketoconazole  Last 2 days, applied lotrisone x2-4 timse per day per our mychart. Helps short term    No cream today and not very itchy this AM  Really bad at night, not sleepin gdue to rash, waking him up  Has used ketoconazole shampoo x2      Hip rash  Dermatitis nonspecific, seen last week  Improved with kenalog 0.5 % ointment  Still can see, much less prominent, not expanding        Objective    BP (!) 153/88   Pulse 68   Temp 97.6  F (36.4  C) (Tympanic)   Resp 16   Wt 120.3 kg (265 lb 3.2 oz)   SpO2 97%   BMI 34.99 kg/m    Body mass index is 34.99 kg/m .  Physical Exam   Well-appearing  Beefy red rash with satellite lesions in the bilateral groin folds and homogenous erythema without tenderness on inferior scrotum            Signed Electronically by: Kamille Gallagher MD

## 2025-03-26 NOTE — PATIENT INSTRUCTIONS
PLAN  DRYNESS:    - after shower, if possible, blow dry area with cool air  - apply drying/baby powder x2-3 per day, including after shower  - consider barrier cream - zinc oxide (Desitin) in between as a protection for the skin    ANTIFUNGAL:  - antifungal pills - start fluconazole x1 pill per week, for 4 weeks  - stop antifungal creams while using this  - pause ketoconazole shampoo incase part of it is contributing to worsening (resistance?)    ITCH:   - steroid pills - 2 pills per day in the AM, x3 days  - stop the steroid containing ointment while on this (Lotrisone)  - once rash back under control/and stopped the oral steroid, can use steroid cream once in awhile as needed (triamcinolone)  - can continue an antihistamine (benadryl at night) as needed    Diabetes screen today to see if that is contributing.

## 2025-04-04 PROCEDURE — 88312 SPECIAL STAINS GROUP 1: CPT | Mod: 26 | Performed by: DERMATOLOGY

## 2025-04-04 PROCEDURE — 88305 TISSUE EXAM BY PATHOLOGIST: CPT | Mod: 26 | Performed by: DERMATOLOGY

## 2025-04-04 PROCEDURE — 88312 SPECIAL STAINS GROUP 1: CPT | Mod: TC | Performed by: STUDENT IN AN ORGANIZED HEALTH CARE EDUCATION/TRAINING PROGRAM

## 2025-04-23 DIAGNOSIS — L30.0 NUMMULAR ECZEMA: Primary | ICD-10-CM

## 2025-04-23 RX ORDER — FLUOCINONIDE 0.5 MG/G
OINTMENT TOPICAL
Qty: 60 G | Refills: 3 | Status: SHIPPED | OUTPATIENT
Start: 2025-04-23

## 2025-07-30 ENCOUNTER — OFFICE VISIT (OUTPATIENT)
Dept: FAMILY MEDICINE | Facility: CLINIC | Age: 70
End: 2025-07-30
Payer: COMMERCIAL

## 2025-07-30 VITALS
OXYGEN SATURATION: 95 % | TEMPERATURE: 98.4 F | HEIGHT: 73 IN | WEIGHT: 268.8 LBS | RESPIRATION RATE: 20 BRPM | DIASTOLIC BLOOD PRESSURE: 79 MMHG | HEART RATE: 67 BPM | BODY MASS INDEX: 35.63 KG/M2 | SYSTOLIC BLOOD PRESSURE: 129 MMHG

## 2025-07-30 DIAGNOSIS — M25.50 ARTHRALGIA, UNSPECIFIED JOINT: ICD-10-CM

## 2025-07-30 DIAGNOSIS — I10 PRIMARY HYPERTENSION: Primary | ICD-10-CM

## 2025-07-30 DIAGNOSIS — L30.0 NUMMULAR ECZEMA: ICD-10-CM

## 2025-07-30 DIAGNOSIS — F10.90 HEAVY ALCOHOL CONSUMPTION: ICD-10-CM

## 2025-07-30 DIAGNOSIS — E78.1 HYPERTRIGLYCERIDEMIA: ICD-10-CM

## 2025-07-30 PROCEDURE — 3078F DIAST BP <80 MM HG: CPT | Performed by: STUDENT IN AN ORGANIZED HEALTH CARE EDUCATION/TRAINING PROGRAM

## 2025-07-30 PROCEDURE — G2211 COMPLEX E/M VISIT ADD ON: HCPCS | Performed by: STUDENT IN AN ORGANIZED HEALTH CARE EDUCATION/TRAINING PROGRAM

## 2025-07-30 PROCEDURE — 99214 OFFICE O/P EST MOD 30 MIN: CPT | Performed by: STUDENT IN AN ORGANIZED HEALTH CARE EDUCATION/TRAINING PROGRAM

## 2025-07-30 PROCEDURE — 3074F SYST BP LT 130 MM HG: CPT | Performed by: STUDENT IN AN ORGANIZED HEALTH CARE EDUCATION/TRAINING PROGRAM

## 2025-07-30 RX ORDER — POTASSIUM CHLORIDE 1500 MG/1
20 TABLET, EXTENDED RELEASE ORAL 2 TIMES DAILY
Qty: 180 TABLET | Refills: 3 | Status: SHIPPED | OUTPATIENT
Start: 2025-07-30

## 2025-07-30 RX ORDER — POTASSIUM CHLORIDE 1500 MG/1
20 TABLET, EXTENDED RELEASE ORAL 2 TIMES DAILY
Qty: 180 TABLET | Refills: 1 | Status: SHIPPED | OUTPATIENT
Start: 2025-07-30 | End: 2025-07-30

## 2025-07-30 RX ORDER — AMLODIPINE BESYLATE 10 MG/1
10 TABLET ORAL DAILY
Qty: 90 TABLET | Refills: 3 | Status: SHIPPED | OUTPATIENT
Start: 2025-07-30

## 2025-07-30 RX ORDER — HYDROCHLOROTHIAZIDE 25 MG/1
25 TABLET ORAL DAILY
Qty: 90 TABLET | Refills: 3 | Status: SHIPPED | OUTPATIENT
Start: 2025-07-30

## 2025-07-30 RX ORDER — ATORVASTATIN CALCIUM 20 MG/1
20 TABLET, FILM COATED ORAL DAILY
Qty: 90 TABLET | Refills: 3 | Status: SHIPPED | OUTPATIENT
Start: 2025-07-30

## 2025-07-30 RX ORDER — LOSARTAN POTASSIUM 100 MG/1
100 TABLET ORAL DAILY
Qty: 90 TABLET | Refills: 3 | Status: SHIPPED | OUTPATIENT
Start: 2025-07-30

## 2025-07-30 RX ORDER — CYCLOBENZAPRINE HCL 10 MG
10 TABLET ORAL 2 TIMES DAILY PRN
Qty: 30 TABLET | Refills: 11 | Status: SHIPPED | OUTPATIENT
Start: 2025-07-30

## 2025-07-30 NOTE — PROGRESS NOTES
Assessment & Plan     Primary hypertension  Stable on current therapies.  Takes potassium for intermittent hypokalemia, severe cramps.  Potassium level has remained normal/stable on chronic potassium therapy, last checked April 2025.  Refills provided, plan for BMP recheck at Medicare visit this winter.  - amLODIPine (NORVASC) 10 MG tablet; Take 1 tablet (10 mg) by mouth daily.  - hydrochlorothiazide (HYDRODIURIL) 25 MG tablet; Take 1 tablet (25 mg) by mouth daily.  - losartan (COZAAR) 100 MG tablet; Take 1 tablet (100 mg) by mouth daily.  - potassium chloride lidia ER (KLOR-CON M20) 20 MEQ CR tablet; Take 1 tablet (20 mEq) by mouth 2 times daily.    Hypertriglyceridemia  LDL 57 on atorvastatin.  No changes today, refill provided.  Recheck lipid later this winter with Medicare visit.  - atorvastatin (LIPITOR) 20 MG tablet; Take 1 tablet (20 mg) by mouth daily.    Nummular eczema  Symptoms totally in remission with tacrolimus therapy prescribed by dermatology.  He does not plan to follow-up with them.  Did not complete UVB treatment originally recommended as the symptoms had improved.  Advised he can have any refills of ointment that he needs through me.    Arthralgia, unspecified joint  Rare use of Flexeril, refill sent  - cyclobenzaprine (FLEXERIL) 10 MG tablet; Take 1 tablet (10 mg) by mouth 2 times daily as needed for muscle spasms.    Heavy alcohol consumption  Has reduced by about 50% on heavy drinking days.  Still overall heavy drinking but he has made some fantastic improvements, will continue to check in.  Recommend aiming for closer to 2 to 4 beers /day on drinking days.      The longitudinal plan of care for the diagnosis(es)/condition(s) as documented were addressed during this visit. Due to the added complexity in care, I will continue to support Armand in the subsequent management and with ongoing continuity of care.      Subjective   Armand is a 70 year old, presenting for the following health  "issues:  Hypertension (HTN follow up, refill meds)      7/30/2025     2:09 PM   Additional Questions   Roomed by Dalia LOVE CMA     History of Present Illness       Reason for visit:  Medication refills   He is taking medications regularly.        Hypertension Follow-up    Do you check your blood pressure regularly outside of the clinic? Yes --not all the time  Are you following a low salt diet? Yes--most of the time  Are your blood pressures ever more than 140 on the top number (systolic) OR more   than 90 on the bottom number (diastolic), for example 140/90? No    No SE concerns  Rarely misses meds  Takes K for ciarra horses - used to bruise with the cramp      Cutting back etoh - hasn't been a struggle  Down to 8-10 beers on his heavy drinking days.  Some days he does not drink at all, some days it is 2-4.      Flexiril - uses rarely  Jeromy with 4-wheeling  - hand and leg cramping    BP Readings from Last 2 Encounters:   07/30/25 129/79   04/04/25 130/70       5/13/25 [facility] dermatology note with KRISTIN Lentz reviewed  - recommended tacrolimus ointment - helped a lot, has not needed to follow up  - UVB - pt declined this, never done            Objective    /79 (BP Location: Right arm, Patient Position: Sitting, Cuff Size: Adult Large)   Pulse 67   Temp 98.4  F (36.9  C) (Tympanic)   Resp 20   Ht 1.854 m (6' 1\")   Wt 121.9 kg (268 lb 12.8 oz)   SpO2 95%   BMI 35.46 kg/m    Body mass index is 35.46 kg/m .  Physical Exam  Constitutional:       General: He is not in acute distress.     Appearance: Normal appearance. He is not ill-appearing.   HENT:      Nose: Nose normal.      Mouth/Throat:      Mouth: Mucous membranes are moist.   Eyes:      Conjunctiva/sclera: Conjunctivae normal.   Cardiovascular:      Rate and Rhythm: Normal rate.   Pulmonary:      Effort: Pulmonary effort is normal.   Skin:     General: Skin is warm.   Neurological:      General: No focal deficit present.      Mental Status: He " is alert.   Psychiatric:         Mood and Affect: Mood normal.         Behavior: Behavior normal.                  Signed Electronically by: Kamille Gallagher MD